# Patient Record
Sex: FEMALE | Race: OTHER | Employment: UNEMPLOYED | URBAN - METROPOLITAN AREA
[De-identification: names, ages, dates, MRNs, and addresses within clinical notes are randomized per-mention and may not be internally consistent; named-entity substitution may affect disease eponyms.]

---

## 2018-10-31 ENCOUNTER — OFFICE VISIT (OUTPATIENT)
Dept: INTERNAL MEDICINE CLINIC | Age: 33
End: 2018-10-31

## 2018-10-31 VITALS
OXYGEN SATURATION: 97 % | SYSTOLIC BLOOD PRESSURE: 107 MMHG | TEMPERATURE: 97.9 F | RESPIRATION RATE: 16 BRPM | HEART RATE: 65 BPM | BODY MASS INDEX: 33.02 KG/M2 | HEIGHT: 67 IN | WEIGHT: 210.4 LBS | DIASTOLIC BLOOD PRESSURE: 76 MMHG

## 2018-10-31 DIAGNOSIS — K21.9 GASTROESOPHAGEAL REFLUX DISEASE, ESOPHAGITIS PRESENCE NOT SPECIFIED: ICD-10-CM

## 2018-10-31 DIAGNOSIS — K80.20 CALCULUS OF GALLBLADDER WITHOUT CHOLECYSTITIS WITHOUT OBSTRUCTION: ICD-10-CM

## 2018-10-31 DIAGNOSIS — R10.11 RIGHT UPPER QUADRANT PAIN: Primary | ICD-10-CM

## 2018-10-31 DIAGNOSIS — Z23 ENCOUNTER FOR IMMUNIZATION: ICD-10-CM

## 2018-10-31 RX ORDER — NORGESTIMATE AND ETHINYL ESTRADIOL 0.25-0.035
1 KIT ORAL DAILY
COMMUNITY
End: 2019-11-12 | Stop reason: ALTCHOICE

## 2018-10-31 NOTE — PROGRESS NOTES
Chief Complaint Patient presents with  Abdominal Pain Glenn Cortes is an 29 y.o. female who presents for evaluation of abdominal pain. The pain is described as burning and stabbing, and is 8/10 in intensity. Pain is located in the RUQ without radiation. Onset was 4 months ago. Symptoms have been unchanged since. Aggravating factors: eating. Alleviating factors: none. Associated symptoms: none. The patient denies anorexia, arthralgias, chills, constipation, dysuria, flatus, hematochezia, melena, myalgias, nausea, sweats and vomiting. Reviewed and agree with Nurse Note and duplicated in this note. Reviewed PmHx, RxHx, FmHx, SocHx, AllgHx and updated and dated in the chart. History reviewed. No pertinent family history. Past Medical History:  
Diagnosis Date  Gallstones Social History Socioeconomic History  Marital status:  Spouse name: Not on file  Number of children: Not on file  Years of education: Not on file  Highest education level: Not on file Social Needs  Financial resource strain: Not on file  Food insecurity - worry: Not on file  Food insecurity - inability: Not on file  Transportation needs - medical: Not on file  Transportation needs - non-medical: Not on file Occupational History  Not on file Tobacco Use  Smoking status: Never Smoker  Smokeless tobacco: Never Used Substance and Sexual Activity  Alcohol use: No  
  Frequency: Never  Drug use: No  
 Sexual activity: Yes Other Topics Concern  Not on file Social History Narrative  Not on file Review of Systems - negative except as listed above Objective:  
 
Vitals:  
 10/31/18 1045 BP: 107/76 Pulse: 65 Resp: 16 Temp: 97.9 °F (36.6 °C) TempSrc: Oral  
SpO2: 97% Weight: 210 lb 6.4 oz (95.4 kg) Height: 5' 7\" (1.702 m) Physical Examination: General appearance - alert, well appearing, and in no distress Eyes - pupils equal and reactive, extraocular eye movements intact Ears - bilateral TM's and external ear canals normal 
Nose - normal and patent, no erythema, discharge or polyps Mouth - mucous membranes moist, pharynx normal without lesions Neck - supple, no significant adenopathy Chest - clear to auscultation, no wheezes, rales or rhonchi, symmetric air entry Heart - normal rate, regular rhythm, normal S1, S2, no murmurs, rubs, clicks or gallops Abdomen - soft, nontender, nondistended, no masses or organomegaly Musculoskeletal - no joint tenderness, deformity or swelling Extremities - peripheral pulses normal, no pedal edema, no clubbing or cyanosis Assessment/ Plan:  
Diagnoses and all orders for this visit: 1. Right upper quadrant pain -     H PYLORI AB, IGG, QT 
-     METABOLIC PANEL, COMPREHENSIVE 
-     REFERRAL TO GASTROENTEROLOGY 2. Encounter for immunization 
-     INFLUENZA VIRUS VAC QUAD,SPLIT,PRESV FREE SYRINGE IM 
-     MS IMMUNIZ ADMIN,1 SINGLE/COMB VAC/TOXOID 3. Gastroesophageal reflux disease, esophagitis presence not specified 4. Calculus of gallbladder without cholecystitis without obstruction No pain currently, will obtain GI referral 
Follow-up Disposition: 
Return if symptoms worsen or fail to improve. Discussed the patient's I have reviewed/discussed the above normal BMI with the patient. I have recommended the following interventions: dietary management education, guidance, and counseling . I have discussed the diagnosis with the patient and the intended plan as seen in the above orders. The patient has received an after-visit summary and questions were answered concerning future plans. Medication Side Effects and Warnings were discussed with patient, Patient Labs were reviewed and or requested, and 
Patient Past Records were reviewed and or requested  yes Pt agrees to call or return to clinic and/or go to closest ER with any worsening of symptoms. This may include, but not limited to increased fever (>100.4) with NSAIDS or Tylenol, increased edema, confusion, rash, worsening of presenting symptoms.

## 2018-11-01 LAB
ALBUMIN SERPL-MCNC: 4.4 G/DL (ref 3.5–5.5)
ALBUMIN/GLOB SERPL: 1.5 {RATIO} (ref 1.2–2.2)
ALP SERPL-CCNC: 78 IU/L (ref 39–117)
ALT SERPL-CCNC: 37 IU/L (ref 0–32)
AST SERPL-CCNC: 19 IU/L (ref 0–40)
BILIRUB SERPL-MCNC: 0.3 MG/DL (ref 0–1.2)
BUN SERPL-MCNC: 12 MG/DL (ref 6–20)
BUN/CREAT SERPL: 17 (ref 9–23)
CALCIUM SERPL-MCNC: 9.8 MG/DL (ref 8.7–10.2)
CHLORIDE SERPL-SCNC: 104 MMOL/L (ref 96–106)
CO2 SERPL-SCNC: 25 MMOL/L (ref 20–29)
CREAT SERPL-MCNC: 0.72 MG/DL (ref 0.57–1)
GLOBULIN SER CALC-MCNC: 3 G/DL (ref 1.5–4.5)
GLUCOSE SERPL-MCNC: 91 MG/DL (ref 65–99)
H PYLORI IGG SER IA-ACNC: 8.6 INDEX VALUE (ref 0–0.79)
POTASSIUM SERPL-SCNC: 3.9 MMOL/L (ref 3.5–5.2)
PROT SERPL-MCNC: 7.4 G/DL (ref 6–8.5)
SODIUM SERPL-SCNC: 144 MMOL/L (ref 134–144)

## 2018-11-01 RX ORDER — LANSOPRAZOLE, AMOXICILLIN, CLARITHROMYCIN 30-500-500
1 KIT ORAL DAILY
Qty: 28 TAB | Refills: 0 | Status: SHIPPED | OUTPATIENT
Start: 2018-11-01 | End: 2018-11-05 | Stop reason: CLARIF

## 2018-11-01 RX ORDER — LANSOPRAZOLE, AMOXICILLIN, CLARITHROMYCIN 30-500-500
1 KIT ORAL DAILY
Qty: 28 TAB | Refills: 0 | Status: SHIPPED | OUTPATIENT
Start: 2018-11-01 | End: 2018-11-01 | Stop reason: SDUPTHER

## 2018-11-02 NOTE — TELEPHONE ENCOUNTER
Medication that was sent needs a PA for her stomach. Please call pts .  is notified that doctor and nurse has left for today.  Advised to take her to ER if she is in a lot of pain

## 2018-11-05 RX ORDER — AMOXICILLIN 500 MG/1
1000 CAPSULE ORAL 2 TIMES DAILY
Qty: 56 CAP | Refills: 0 | Status: SHIPPED | OUTPATIENT
Start: 2018-11-05 | End: 2018-11-19

## 2018-11-05 RX ORDER — CLARITHROMYCIN 500 MG/1
500 TABLET, FILM COATED ORAL 2 TIMES DAILY
Qty: 28 TAB | Refills: 0 | Status: SHIPPED | OUTPATIENT
Start: 2018-11-05 | End: 2018-11-19

## 2018-11-05 RX ORDER — LANSOPRAZOLE 30 MG/1
30 CAPSULE, DELAYED RELEASE ORAL
Qty: 14 CAP | Refills: 0 | Status: SHIPPED | OUTPATIENT
Start: 2018-11-05 | End: 2018-11-19

## 2018-11-08 ENCOUNTER — TELEPHONE (OUTPATIENT)
Dept: INTERNAL MEDICINE CLINIC | Age: 33
End: 2018-11-08

## 2018-11-08 NOTE — TELEPHONE ENCOUNTER
I called patient and relayed information from Dr. La Stephens. Patient understood and will call with any questions.

## 2018-11-08 NOTE — TELEPHONE ENCOUNTER
----- Message from Silvestre Thorpe MD sent at 11/8/2018  8:14 AM EST -----  Regarding: RE: Dr. Caty Andrade  While these medications are safe for breast-feeding they can be excreted in small amounts in breastmilk. If she would like to hold off on taking the medications until after finished breast-feeding I think this is a fair option. RB  ----- Message -----  From: Jairo Lopez  Sent: 11/7/2018   4:05 PM  To: Silvestre Thorpe MD  Subject: FW: Dr. Caty Andrade                           Is it okay to take all 3 meds while breast feeding?    ----- Message -----  From: Dallas Armas  Sent: 11/7/2018   3:38 PM  To: Javi Encinas  Subject: FW: Dr. Caty Andrade                               ----- Message -----  From: Matthew Reina  Sent: 11/7/2018   3:29 PM  To: [facility] Front Office  Subject: Dr. Diya Sauceda () is requesting a call back to verify that an antibiotic is ok for his wife to take. He does not know the name of the Rx. Best contact [phone].

## 2019-03-26 ENCOUNTER — OFFICE VISIT (OUTPATIENT)
Dept: INTERNAL MEDICINE CLINIC | Age: 34
End: 2019-03-26

## 2019-03-26 VITALS
WEIGHT: 218 LBS | RESPIRATION RATE: 16 BRPM | DIASTOLIC BLOOD PRESSURE: 75 MMHG | OXYGEN SATURATION: 99 % | TEMPERATURE: 98 F | HEART RATE: 68 BPM | HEIGHT: 67 IN | SYSTOLIC BLOOD PRESSURE: 134 MMHG | BODY MASS INDEX: 34.21 KG/M2

## 2019-03-26 DIAGNOSIS — M54.31 RIGHT SIDED SCIATICA: Primary | ICD-10-CM

## 2019-03-26 NOTE — PROGRESS NOTES
Chief Complaint Patient presents with  Abdominal Pain 42-year-old female patient with a 8month-old baby complains of right-sided low back pain that radiates down to her knee and then to her foot. Patient describes his pain is sharp and can at times be 8 out of 10 in nature. Patient states that she is breast-feeding and has not had a period in 10 months where she is breast-feeding. Denies any trauma or similar symptoms in the past. 
Of note she is recently had a gallbladder surgery which she still has some residual pain in her abdomen rated at a 1 out of 10. She has a follow-up with GI Reviewed and agree with Nurse Note and duplicated in this note. Reviewed PmHx, RxHx, FmHx, SocHx, AllgHx and updated and dated in the chart. History reviewed. No pertinent family history. Past Medical History:  
Diagnosis Date  Gallstones Social History Socioeconomic History  Marital status:  Spouse name: Not on file  Number of children: Not on file  Years of education: Not on file  Highest education level: Not on file Tobacco Use  Smoking status: Never Smoker  Smokeless tobacco: Never Used Substance and Sexual Activity  Alcohol use: No  
  Frequency: Never  Drug use: No  
 Sexual activity: Yes Review of Systems - negative except as listed above Objective:  
 
Vitals:  
 03/26/19 1511 BP: 134/75 Pulse: 68 Resp: 16 Temp: 98 °F (36.7 °C) TempSrc: Oral  
SpO2: 99% Weight: 218 lb (98.9 kg) Height: 5' 7\" (1.702 m) Physical Examination: General appearance - alert, well appearing, and in no distress Chest - clear to auscultation, no wheezes, rales or rhonchi, symmetric air entry Heart - normal rate, regular rhythm, normal S1, S2, no murmurs, rubs, clicks or gallops Abdomen - soft, nontender, nondistended, no masses or organomegaly Back exam - full range of motion, no tenderness, palpable spasm or pain on motion Neurological - alert, oriented, normal speech, no focal findings or movement disorder noted Musculoskeletal - no joint tenderness, deformity or swelling Extremities - peripheral pulses normal, no pedal edema, no clubbing or cyanosis Skin - normal coloration and turgor, no rashes, no suspicious skin lesions noted Assessment/ Plan:  
Diagnoses and all orders for this visit: 1. Right sided sciatica -     XR SPINE LUMB 2 OR 3 V; Future 
-     REFERRAL TO PHYSICAL THERAPY I have reviewed/discussed the above normal BMI with the patient. I have recommended the following interventions: dietary management education, guidance, and counseling . I have discussed the diagnosis with the patient and the intended plan as seen in the above orders. The patient has received an after-visit summary and questions were answered concerning future plans. Medication Side Effects and Warnings were discussed with patient, Patient Labs were reviewed and or requested, and 
Patient Past Records were reviewed and or requested  yes Pt agrees to call or return to clinic and/or go to closest ER with any worsening of symptoms. This may include, but not limited to increased fever (>100.4) with NSAIDS or Tylenol, increased edema, confusion, rash, worsening of presenting symptoms.

## 2019-04-16 ENCOUNTER — HOSPITAL ENCOUNTER (OUTPATIENT)
Dept: PHYSICAL THERAPY | Age: 34
End: 2019-04-16
Payer: MEDICAID

## 2019-04-16 ENCOUNTER — HOSPITAL ENCOUNTER (OUTPATIENT)
Dept: PHYSICAL THERAPY | Age: 34
Discharge: HOME | End: 2019-04-16
Payer: MEDICAID

## 2019-04-16 PROCEDURE — 97161 PT EVAL LOW COMPLEX 20 MIN: CPT

## 2019-04-16 PROCEDURE — 97110 THERAPEUTIC EXERCISES: CPT

## 2019-04-16 NOTE — PROGRESS NOTES
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 SUBJECTIVE  Pain Level (0-10 scale): 2/10  Any medication changes, allergies to medications, adverse drug reactions, diagnosis change, or new procedure performed?: [] No    [x] Yes (see summary sheet for update)  Subjective:    Pt was referred to skilled PT for right-sided LBP with sciatica. Today, Pt reports primary complaints of intermittent right-sided low back pain with posterior right leg symptoms (numbness) to right foot. Pt notes mild increased weakness in RLE. Mechanism of Injury: Symptoms began 3/3/19, Pt lifted a heavy suitcase and felt sharp pain in low back, and then started to develop symptoms in RLE. Aggravating factors include prolonged standing (30 minutes), prolonged sitting (45 minutes), bending forward, picking up objects from floor. Relieving factors include sitting, lying down. Patient reports functional limitations with: cooking     Previous Treatment/Compliance: None   Work Hx: Not working    Living Situation: Pt lives with  in apartment  PMHx/Surgical Hx:  ();  \"Of note she recently had a gallbladder surgery which she still has some residual pain in her abdomen rated at a 1 out of 10.\" (per MD)     Pt Goals: Feel better and less pain     OBJECTIVE    Posture:  Mild slumped  Other Observations:  Pt speaks Bengali only; used blue phone for communication;  was upstairs for appointment and speaks moderately fluent English  Gait and Functional Mobility:  Antalgic  Palpation: Significant TTP L3 and L4 spinous processes; mild increased turgor/tenderness lumbar paraspinals bilaterally R>L; TTP R knee lateral joint line        Lumbar AROM:          R  L    Flexion    100% increased symptoms in RLE       Extension   50% p! In low back to RLE      Side Bending   100% mild R low back  100% stretch R    Rotation   100% p!  100% p! LOWER QUARTER   MUSCLE STRENGTH  KEY       R  L  0 - No Contraction  L1, L2 Psoas  4-  4  1 - Trace   L3 Quads  5  5  2 - Poor   L4 Tib Ant  5  5  3 - Fair    L5 EHL  5  5  4 - Good   S1 Peroneals  5  5  5 - Normal   S2 Hams  4 p! Low back 5    Flexibility: Decreased flexibility of bilateral hip flexors and right hamstring       MMT:               HIP Ext: 4- bilaterally              HIP Abd: R: 3; L: 3+  Neurological: Reflexes / Sensations: Not assessed today  Special Tests:    FABERS: (+)     FADDIR: (-)   Forward Bend: (+)    Slump: (+) R with knee extension and DF (LBP)   H.S. SLR: (+) 70 R; 90 L   Piriformis Ext: (-)    10 min Therapeutic Exercise:  [] See flow sheet :   Rationale: increase ROM and increase strength to improve the patients ability to perform functional activities with decreased pain or discomfort. With   [x] TE   [] TA   [] neuro   [] other: Patient Education: [x] Review HEP    [] Progressed/Changed HEP based on:   [] positioning   [] body mechanics   [] transfers   [] heat/ice application    [x] other: Educated Pt regarding impairments, role of PT, and POC.        Other Objective/Functional Measures:   FOTO Score: 94/100    Pain Level (0-10 scale) post treatment: 2/10    ASSESSMENT/Changes in Function:     [x]  See Plan of [address] 4/16/2019

## 2019-04-16 NOTE — PROGRESS NOTES
[facility] [place] [facility]  [address], [facility] [address]   Phone: [phone]  Fax: [phone]    Plan of Care/Statement of Necessity for Physical Therapy Services  2-15    Patient name: Dana Benito  : 1985  Provider#: [id number]  Referral source: Carmela Cheung MD      Medical/Treatment Diagnosis: No admission diagnoses are documented for this encounter. Prior Hospitalization: see medical history     Comorbidities: Back pain, BMI>30, Prior surgery  Prior Level of Function: Patient completed 20 minutes of exercise seldom or never. Medications: Verified on Patient Summary List    Start of Care: 19      Onset Date: 3/3/19       The Plan of Care and following information is based on the information from the initial evaluation. Assessment/ key information: Pt is a pleasant 34year old female who was referred to skilled PT for right-sided LBP with sciatica. Pt reports symptoms began in the beginning of March when bending over to lift up a heavy suitcase. Based on examination, patient presents with symptoms consistent with lumbar derangement and right-sided radiculopathy. Pt exhibits most tenderness over L3-4 spinous processes, decreased lumbar AROM, significant hip and core weakness, and impaired gait mechanics.     Evaluation Complexity History MEDIUM  Complexity : 1-2 comorbidities / personal factors will impact the outcome/ POC ; Examination MEDIUM Complexity : 3 Standardized tests and measures addressing body structure, function, activity limitation and / or participation in recreation  ;Presentation LOW Complexity : Stable, uncomplicated  ;Clinical Decision Making LOW Complexity : FOTO score of   Overall Complexity Rating: LOW     Problem List: pain affecting function, decrease ROM, decrease strength, decrease ADL/ functional abilitiies, decrease activity tolerance and decrease flexibility/ joint mobility   Treatment Plan may include any combination of the following: Therapeutic exercise, Therapeutic activities, Neuromuscular re-education, Physical agent/modality, Manual therapy and Patient education  Patient / Family readiness to learn indicated by: asking questions, trying to perform skills and interest  Persons(s) to be included in education: patient (P)  Barriers to Learning/Limitations: None  Patient Goal (s): Feel better and less pain.   Patient Self Reported Health Status: good  Rehabilitation Potential: good    Short and Long Term Goals: To be accomplished in 12 treatments:   1. Pt will be independent and compliant with HEP. 2. Pt will improve FOTO score by the MCID demonstrating improved overall function with decreased pain or discomfort. 3. Pt will be able to stand >/= 45 minutes without complaints of increased low back or right leg pain in order to return to cooking with decreased discomfort/restriction. 4. Pt will be able to sit for >/= 60 minutes without complaints of increased low back or right leg pain. 5. Pt will be able to bend over to pick objects up off the floor without increased low back or right leg pain. Frequency / Duration: Patient to be seen 1-2 times per week for 12 treatments. Patient/ Caregiver education and instruction: self care, activity modification and exercises    [x]  Plan of care has been reviewed with NAOMIE Gordon 4/16/2019     ________________________________________________________________________    I certify that the above Therapy Services are being furnished while the patient is under my care. I agree with the treatment plan and certify that this therapy is necessary.     [de-identified] Signature:____________________  Date:____________Time: _________

## 2019-04-23 ENCOUNTER — APPOINTMENT (OUTPATIENT)
Dept: PHYSICAL THERAPY | Age: 34
End: 2019-04-23
Payer: MEDICAID

## 2019-04-24 ENCOUNTER — APPOINTMENT (OUTPATIENT)
Dept: PHYSICAL THERAPY | Age: 34
End: 2019-04-24
Payer: MEDICAID

## 2019-04-30 ENCOUNTER — HOSPITAL ENCOUNTER (OUTPATIENT)
Dept: PHYSICAL THERAPY | Age: 34
Discharge: HOME | End: 2019-04-30
Payer: MEDICAID

## 2019-04-30 PROCEDURE — 97110 THERAPEUTIC EXERCISES: CPT

## 2019-04-30 NOTE — PROGRESS NOTES
PT DAILY TREATMENT NOTE - [facility] 2-15    Patient Name: Joaquina Wilson  Date:2019  : 1985  [x]  Patient  Verified  Payor: BLUE CROSS MEDICAID / Plan: [facility] [email or web address] HE[employer]KEEPERS PLUS / Product Type: Managed Care Medicaid /    In time:4:30 pm  Out time:5:14 pm  Total Treatment Time (min): 44 minutes  Total Timed Codes (min): 44 minutes  1:1 Treatment Time ( only): 39 minutes  Visit #:  2    Treatment Area: Low back pain [M54.5]  Right leg pain [M79.604]    SUBJECTIVE  Pain Level (0-10 scale): 1/10  Any medication changes, allergies to medications, adverse drug reactions, diagnosis change, or new procedure performed?: [x] No    [] Yes (see summary sheet for update)  Subjective functional status/changes:   [] No changes reported  Pt reports (via translation of her ) she feels better, and is able to lean forward with less pain. She is no longer experiencing symptoms into her right leg and able to stand washing dishes for longer with only mild discomfort in her low back. She has been diligent with her exercises. OBJECTIVE    44 min Therapeutic Exercise:  [] See flow sheet :   Rationale: increase ROM and increase strength to improve the patients ability to perform functional activities with decreased pain. With   [x] TE   [] TA   [] neuro   [] other: Patient Education: [x] Review HEP    [] Progressed/Changed HEP based on:   [] positioning   [] body mechanics   [] transfers   [] heat/ice application    [] other:      Other Objective/Functional Measures: --     Pain Level (0-10 scale) post treatment: 0/10    ASSESSMENT/Changes in Function:   Introduced clam shells for increased hip strength with verbal and tactile cues to maintain anterior rotation of hips. Also incorporated core stabilization with standing core press; provided cues for proper technique and posture to engage core properly.  Pt noted knee pain initially with total gym which was relieved with cues to decrease knee flexion, push down through her heels, and band resistance around knees. Pt responding well to exercise, continue progressing as tolerated. Patient will continue to benefit from skilled PT services to modify and progress therapeutic interventions, address functional mobility deficits, address ROM deficits, address strength deficits, analyze and address soft tissue restrictions, analyze and cue movement patterns, analyze and modify body mechanics/ergonomics and assess and modify postural abnormalities to attain remaining goals. [x]  See Plan of Care  []  See progress note/recertification  []  See Discharge Summary         Progress towards goals / Updated goals:  Short and Long Term Goals: To be accomplished in 12 treatments:               1. Pt will be independent and compliant with HEP. 2. Pt will improve FOTO score by the MCID demonstrating improved overall function with decreased pain or discomfort. 3. Pt will be able to stand >/= 45 minutes without complaints of increased low back or right leg pain in order to return to cooking with decreased discomfort/restriction. 4. Pt will be able to sit for >/= 60 minutes without complaints of increased low back or right leg pain. 5. Pt will be able to bend over to pick objects up off the floor without increased low back or right leg pain.     PLAN  [x]  Upgrade activities as tolerated     [x]  Continue plan of care  []  Update interventions per flow sheet       []  Discharge due to:_  []  Other:_      Félix Cortez 4/30/2019

## 2019-05-07 ENCOUNTER — HOSPITAL ENCOUNTER (OUTPATIENT)
Dept: PHYSICAL THERAPY | Age: 34
End: 2019-05-07
Payer: MEDICAID

## 2019-05-14 ENCOUNTER — OFFICE VISIT (OUTPATIENT)
Dept: INTERNAL MEDICINE CLINIC | Age: 34
End: 2019-05-14

## 2019-05-14 ENCOUNTER — HOSPITAL ENCOUNTER (OUTPATIENT)
Dept: PHYSICAL THERAPY | Age: 34
Discharge: HOME | End: 2019-05-14
Payer: MEDICAID

## 2019-05-14 VITALS
OXYGEN SATURATION: 97 % | DIASTOLIC BLOOD PRESSURE: 79 MMHG | SYSTOLIC BLOOD PRESSURE: 119 MMHG | WEIGHT: 214.7 LBS | BODY MASS INDEX: 33.63 KG/M2 | TEMPERATURE: 98.2 F | HEART RATE: 70 BPM

## 2019-05-14 DIAGNOSIS — M94.0 COSTOCHONDRITIS: ICD-10-CM

## 2019-05-14 DIAGNOSIS — R10.11 RIGHT UPPER QUADRANT PAIN: Primary | ICD-10-CM

## 2019-05-14 PROCEDURE — 97110 THERAPEUTIC EXERCISES: CPT

## 2019-05-14 RX ORDER — ACETAMINOPHEN AND CODEINE PHOSPHATE 120; 12 MG/5ML; MG/5ML
SOLUTION ORAL
Refills: 3 | COMMUNITY
Start: 2019-05-04 | End: 2019-05-14 | Stop reason: SDUPTHER

## 2019-05-14 RX ORDER — ACETAMINOPHEN AND CODEINE PHOSPHATE 120; 12 MG/5ML; MG/5ML
SOLUTION ORAL
Qty: 1 DOSE PACK | Refills: 3 | Status: SHIPPED | OUTPATIENT
Start: 2019-05-14 | End: 2019-09-15 | Stop reason: SDUPTHER

## 2019-05-14 NOTE — PROGRESS NOTES
Chief Complaint   Patient presents with    Abdominal Pain      Susie Miller is an [address] [place] y.o. female who presents for evaluation of abdominal pain. The pain is described as burning and sharp, and is 0/10 in intensity. Pain is located in the RUQ without radiation. Onset was 6 months ago. Symptoms have been unchanged since. Aggravating factors: movement, activity and pressure. Alleviating factors: none. Associated symptoms: patient has surgery. The patient denies diarrhea, nausea, urinary frequency, urinary urgency and vomiting. Reviewed and agree with Nurse Note and duplicated in this note. Reviewed PmHx, RxHx, FmHx, SocHx, AllgHx and updated and dated in the chart. No family history on file.     Past Medical History:   Diagnosis Date    Gallstones       Social History     Socioeconomic History    Marital status:      Spouse name: Not on file    Number of children: Not on file    Years of education: Not on file    Highest education level: Not on file   Tobacco Use    Smoking status: Never Smoker    Smokeless tobacco: Never Used   Substance and Sexual Activity    Alcohol use: No     Frequency: Never    Drug use: No    Sexual activity: Yes        Review of Systems - negative except as listed above      Objective:     Vitals:    05/14/19 1514   BP: 119/79   Pulse: 70   Temp: 98.2 °F (36.8 °C)   SpO2: 97%   Weight: 214 lb 11.2 oz (97.4 kg)       Physical Examination: General appearance - alert, well appearing, and in no distress  Eyes - pupils equal and reactive, extraocular eye movements intact  Ears - bilateral TM's and external ear canals normal  Nose - normal and patent, no erythema, discharge or polyps  Mouth - mucous membranes moist, pharynx normal without lesions  Neck - supple, no significant adenopathy  Chest - clear to auscultation, no wheezes, rales or rhonchi, symmetric air entry  Heart - normal rate, regular rhythm, normal S1, S2, no murmurs, rubs, clicks or gallops  Abdomen - soft, nontender, nondistended, no masses or organomegaly  Musculoskeletal - no joint tenderness, deformity or swelling  Extremities - peripheral pulses normal, no pedal edema, no clubbing or cyanosis    Assessment/ Plan:   Diagnoses and all orders for this visit:    1. Right upper quadrant pain  -     REFERRAL TO GASTROENTEROLOGY    2. Costochondritis    Other orders  -     norethindrone (MICRONOR) 0.35 mg tab; TAKE 1 TABLET BY MOUTH EVERY DAY       Tinea ibuprofen 800 mg 3 times a day  Return to clinic if no resolution of symptoms        I have discussed the diagnosis with the patient and the intended plan as seen in the above orders. The patient has received an after-visit summary and questions were answered concerning future plans. Medication Side Effects and Warnings were discussed with patient,  Patient Labs were reviewed and or requested, and  Patient Past Records were reviewed and or requested  yes         Pt agrees to call or return to clinic and/or go to closest ER with any worsening of symptoms. This may include, but not limited to increased fever (>100.4) with NSAIDS or Tylenol, increased edema, confusion, rash, worsening of presenting symptoms.

## 2019-05-14 NOTE — PATIENT INSTRUCTIONS

## 2019-05-14 NOTE — ANCILLARY DISCHARGE INSTRUCTIONS
[facility]  [address], [address] [place] [address]  Phone: [phone]  Fax: [phone]    Medicaid Discharge Summary  2-15    Patient name: Stefan Barragan  : 1985  Provider#: [id number]  Referral source: Brii Taylor MD      Medical/Treatment Diagnosis: Low back pain [M54.5]  Right leg pain [M79.604]     Prior Hospitalization: see medical history     Comorbidities: Back pain, BMI>30, Prior surgery  Prior Level of Function: Patient completed 20 minutes of exercise seldom or never. Medications: Verified on Patient Summary List     Start of Care: 19                                                            Onset Date: 3/3/19          Visits from Start of Care: 3     Missed Visits: 1  Reporting Period : 19 to 19      ASSESSMENT/SUMMARY OF CARE:  Ms. Kranthi Mosqueda has been seen for 3 skilled physical therapy visits secondary to low back pain and right-sided lumbar radiculopathy. Pt progressed very well with her therapy program and is no longer experiencing low back pain or radiating symptoms in her right leg. She was unable to stand for longer than 45 minutes upon evaluation, but is now standing >90 minutes without complaints of low back pain. She is also able to carry objects and pick objects up off the floor without increased pain. Pt has been provided a comprehensive HEP to maintain benefit independently. Thank you for this referral!    Short and Long Term Goals:  To be accomplished in 12 treatments:               1. Pt will be independent and compliant with HEP. - MET               2. Pt will improve FOTO score by the MCID demonstrating improved overall function with decreased pain or discomfort. - MET               3. Pt will be able to stand >/= 45 minutes without complaints of increased low back or right leg pain in order to return to cooking with decreased discomfort/restriction. - MET               4. Pt will be able to sit for >/= 60 minutes without complaints of increased low back or right leg pain. - MET               5. Pt will be able to bend over to pick objects up off the floor without increased low back or right leg pain. - MET     RECOMMENDATIONS:  [x]Discontinue therapy: [x]Patient has reached or is progressing toward set goals      []Patient is non-compliant or has abdicated      []Due to lack of appreciable progress towards set goals    Volodymyr Restrepo 5/14/2019     ______________________________________________________________________    NOTE TO PHYSICIAN:  Please complete the following and fax to: [facility]: Fax: [phone] . Rach Raza Retain this original for your records. If you are unable to process this request in 24 hours, please contact our office.      [de-identified] Signature:____________________  Date:____________Time:_________

## 2019-05-14 NOTE — PROGRESS NOTES
PT DAILY TREATMENT NOTE - [facility] 2-15    Patient Name: Aline Rasheed  Date:2019  : 1985  [x]  Patient  Verified  Payor: [employer] MEDICA[employer] / Plan: [employer] / Product Type: Managed Care Medicaid /    In time: 3:55 pm  Out time: 4:50 pm  Total Treatment Time (min): 55 minutes  Total Timed Codes (min): 55 minutes  1:1 Treatment Time ( only): 55 minutes  Visit #:  3    Treatment Area: Low back pain [M54.5]  Right leg pain [M79.604]    SUBJECTIVE  Pain Level (0-10 scale): 0/10  Any medication changes, allergies to medications, adverse drug reactions, diagnosis change, or new procedure performed?: [x] No    [] Yes (see summary sheet for update)  Subjective functional status/changes:   [] No changes reported  Pt reports (via translation of her ) that her back has been doing well and has not been having any pain. She is experiencing some right-sided chest pain and was diagnosed with costochondritis. She stopped doing KRISH due to irritation in chest, but all other exercises feel good. OBJECTIVE    55 min Therapeutic Exercise:  [] See flow sheet :   Rationale: increase ROM and increase strength to improve the patients ability to perform functional activities with decreased pain. With   [x] TE   [] TA   [] neuro   [] other: Patient Education: [x] Review HEP    [] Progressed/Changed HEP based on:   [] positioning   [] body mechanics   [] transfers   [] heat/ice application    [] other:      Other Objective/Functional Measures:   0-100: 80% improved: Now she can carry and stand for long periods of time without discomfort (90+ minutes) (could not take a shower)  FOTO Score: 94/100    Pain Level (0-10 scale) post treatment: 0/10    ASSESSMENT/Changes in Function:     []  See Plan of Care  []  See progress note/recertification  [x]  See Discharge Summary         Progress towards goals / Updated goals:  Short and Long Term Goals:  To be accomplished in 12 treatments: 1. Pt will be independent and compliant with HEP. - MET               2. Pt will improve FOTO score by the MCID demonstrating improved overall function with decreased pain or discomfort. - MET                3. Pt will be able to stand >/= 45 minutes without complaints of increased low back or right leg pain in order to return to cooking with decreased discomfort/restriction. - MET               4. Pt will be able to sit for >/= 60 minutes without complaints of increased low back or right leg pain. - MET               5. Pt will be able to bend over to pick objects up off the floor without increased low back or right leg pain. - MET    PLAN  []  Upgrade activities as tolerated     []  Continue plan of care  []  Update interventions per flow sheet       [x]  Discharge due to: Pt reaching all short and long-term goals.    []  Other:_      London Greer 5/14/2019

## 2019-09-16 RX ORDER — ACETAMINOPHEN AND CODEINE PHOSPHATE 120; 12 MG/5ML; MG/5ML
SOLUTION ORAL
Qty: 28 TAB | Refills: 3 | Status: SHIPPED | OUTPATIENT
Start: 2019-09-16 | End: 2020-01-13

## 2019-11-12 ENCOUNTER — OFFICE VISIT (OUTPATIENT)
Dept: INTERNAL MEDICINE CLINIC | Age: 34
End: 2019-11-12

## 2019-11-12 VITALS
DIASTOLIC BLOOD PRESSURE: 63 MMHG | OXYGEN SATURATION: 99 % | WEIGHT: 226.4 LBS | SYSTOLIC BLOOD PRESSURE: 99 MMHG | TEMPERATURE: 97.8 F | BODY MASS INDEX: 35.53 KG/M2 | RESPIRATION RATE: 16 BRPM | HEIGHT: 67 IN | HEART RATE: 75 BPM

## 2019-11-12 DIAGNOSIS — R07.89 COSTOCHONDRAL PAIN: Primary | ICD-10-CM

## 2019-11-12 DIAGNOSIS — R10.11 RIGHT UPPER QUADRANT PAIN: ICD-10-CM

## 2019-11-12 DIAGNOSIS — E66.01 SEVERE OBESITY (HCC): ICD-10-CM

## 2019-11-12 NOTE — PROGRESS NOTES
No chief complaint on file. she is a 34y.o. year old female who presents for follow up of injury. Follow Up Pain Assessment Encounter      Onset of Symptoms: a couple months   ________________________________________________________________________  Description: Pain is now back and also around the rib cage    Pain Scale:(1-10): 5  Duration:  intermittent  Radiation: leg  What makes it better?: OTC meds and rest  What makes it worse?:exercise, sitting and walking  Medications tried: acetaminophen, ibuprofen  Modalities tried: PT         Reviewed and agree with Nurse Note and duplicated in this note. Reviewed PmHx, RxHx, FmHx, SocHx, AllgHx and updated and dated in the chart. No family history on file.     Past Medical History:   Diagnosis Date    Gallstones       Social History     Socioeconomic History    Marital status:      Spouse name: Not on file    Number of children: Not on file    Years of education: Not on file    Highest education level: Not on file   Tobacco Use    Smoking status: Never Smoker    Smokeless tobacco: Never Used   Substance and Sexual Activity    Alcohol use: No     Frequency: Never    Drug use: No    Sexual activity: Yes        Review of Systems - negative except as listed above      Objective:     Vitals:    11/12/19 1144   BP: 99/63   Pulse: 75   Resp: 16   Temp: 97.8 °F (36.6 °C)   TempSrc: Oral   SpO2: 99%   Weight: 226 lb 6.4 oz (102.7 kg)   Height: 5' 7\" (1.702 m)       Physical Examination: General appearance - alert, well appearing, and in no distress  Chest - clear to auscultation, no wheezes, rales or rhonchi, symmetric air entry  Heart - normal rate, regular rhythm, normal S1, S2, no murmurs, rubs, clicks or gallops  Abdomen - soft, nontender, nondistended, no masses or organomegaly  Back exam - full range of motion, no tenderness, palpable spasm or pain on motion  Neurological - alert, oriented, normal speech, no focal findings or movement disorder noted  Musculoskeletal -pain with palpation of right chest wall under breast line and axilla along rib cage  Extremities - peripheral pulses normal, no pedal edema, no clubbing or cyanosis  Skin - normal coloration and turgor, no rashes, no suspicious skin lesions noted    Assessment/ Plan:   Diagnoses and all orders for this visit:    1. Costochondral pain  -     XR CHEST PA LAT; Future    2. Severe obesity ([address]    3. Right upper quadrant pain  -     METABOLIC PANEL, COMPREHENSIVE           Pathophysiology, recovery and rehabilitation process discussed and questions answered   Counseling for 30 Minutes of the total visit duration   Pictures and figures used as necessary   Provided reassurance   Recommend activity modification   Recommend  lower impact activities-walking, Eliptical, Nordic Track, cycling or swimming   Follow up in 4 week(s)        I have discussed the diagnosis with the patient and the intended plan as seen in the above orders. The patient has received an after-visit summary and questions were answered concerning future plans. Medication Side Effects and Warnings were discussed with patient,  Patient Labs were reviewed and or requested, and  Patient Past Records were reviewed and or requested  yes     Pt agrees to call or return to clinic and/or go to closest ER with any worsening of symptoms. This may include, but not limited to increased fever (>100.4) with NSAIDS or Tylenol, increased edema, confusion, rash, worsening of presenting symptoms. 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 Most recent MRI with minimal residual tumor remaining. 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 History of Staph epidermidis CLABSI and ESBL+ E. coli which prompted removal of DL Mediport during previous admission. Patient now admitted for Single Cycle Consolidation with high dose chemotherapy and autologous peripheral blood stem cell rescue. Today is day -4 (7/13)    Sharron has continued to do well this admission. She will continue Etoposide and Thiotepa today (7/12-7/14). Due for Aloxi today for nausea. Patient with mild fever (38.1) overnight, defervesced without tylenol. Due to history of ecoli and staph epi bacteremia, patient was started on vancomycin and cefepime 7/13.

## 2019-11-13 DIAGNOSIS — R73.9 ELEVATED BLOOD SUGAR: Primary | ICD-10-CM

## 2019-11-13 LAB
ALBUMIN SERPL-MCNC: 4.3 G/DL (ref 3.5–5.5)
ALBUMIN/GLOB SERPL: 1.5 {RATIO} (ref 1.2–2.2)
ALP SERPL-CCNC: 72 IU/L (ref 39–117)
ALT SERPL-CCNC: 21 IU/L (ref 0–32)
AST SERPL-CCNC: 17 IU/L (ref 0–40)
BILIRUB SERPL-MCNC: 0.3 MG/DL (ref 0–1.2)
BUN SERPL-MCNC: 7 MG/DL (ref 6–20)
BUN/CREAT SERPL: 10 (ref 9–23)
CALCIUM SERPL-MCNC: 9.7 MG/DL (ref 8.7–10.2)
CHLORIDE SERPL-SCNC: 105 MMOL/L (ref 96–106)
CO2 SERPL-SCNC: 25 MMOL/L (ref 20–29)
CREAT SERPL-MCNC: 0.67 MG/DL (ref 0.57–1)
GLOBULIN SER CALC-MCNC: 2.8 G/DL (ref 1.5–4.5)
GLUCOSE SERPL-MCNC: 110 MG/DL (ref 65–99)
POTASSIUM SERPL-SCNC: 4.2 MMOL/L (ref 3.5–5.2)
PROT SERPL-MCNC: 7.1 G/DL (ref 6–8.5)
SODIUM SERPL-SCNC: 141 MMOL/L (ref 134–144)

## 2019-11-21 ENCOUNTER — CLINICAL SUPPORT (OUTPATIENT)
Dept: INTERNAL MEDICINE CLINIC | Age: 34
End: 2019-11-21

## 2019-11-21 DIAGNOSIS — R73.9 ELEVATED BLOOD SUGAR: Primary | ICD-10-CM

## 2019-11-22 LAB
EST. AVERAGE GLUCOSE BLD GHB EST-MCNC: 111 MG/DL
HBA1C MFR BLD: 5.5 % (ref 4.8–5.6)

## 2020-01-13 RX ORDER — ACETAMINOPHEN AND CODEINE PHOSPHATE 120; 12 MG/5ML; MG/5ML
SOLUTION ORAL
Qty: 28 TAB | Refills: 3 | Status: SHIPPED | OUTPATIENT
Start: 2020-01-13 | End: 2020-05-06 | Stop reason: SDUPTHER

## 2020-05-06 ENCOUNTER — VIRTUAL VISIT (OUTPATIENT)
Dept: INTERNAL MEDICINE CLINIC | Age: 35
End: 2020-05-06

## 2020-05-06 DIAGNOSIS — Z30.09 FAMILY PLANNING: ICD-10-CM

## 2020-05-06 DIAGNOSIS — K21.9 GASTROESOPHAGEAL REFLUX DISEASE, ESOPHAGITIS PRESENCE NOT SPECIFIED: Primary | ICD-10-CM

## 2020-05-06 RX ORDER — FAMOTIDINE 40 MG/1
40 TABLET, FILM COATED ORAL DAILY
Qty: 30 TAB | Refills: 0 | Status: SHIPPED | OUTPATIENT
Start: 2020-05-06 | End: 2021-03-24 | Stop reason: SDUPTHER

## 2020-05-06 RX ORDER — ACETAMINOPHEN AND CODEINE PHOSPHATE 120; 12 MG/5ML; MG/5ML
1 SOLUTION ORAL DAILY
Qty: 28 TAB | Refills: 3 | Status: SHIPPED | OUTPATIENT
Start: 2020-05-06 | End: 2020-08-31

## 2020-05-06 NOTE — PROGRESS NOTES
Srini Landrum is a 30 y.o. female who was seen by synchronous (real-time) audio-video technology on 5/6/2020. Consent: Srini Landrum, who was seen by synchronous (real-time) audio-video technology, and/or her healthcare decision maker, is aware that this patient-initiated, Telehealth encounter on 5/6/2020 is a billable service, with coverage as determined by her insurance carrier. She is aware that she may receive a bill and has provided verbal consent to proceed: Yes. Assessment & Plan:   Diagnoses and all orders for this visit:    1. Gastroesophageal reflux disease, esophagitis presence not specified    2. Family planning    Other orders  -     famotidine (PEPCID) 40 mg tablet; Take 1 Tab by mouth daily. -     norethindrone (MICRONOR) 0.35 mg tab; Take 1 Tab by mouth daily. Subjective:   Srini Landrum is a 30 y.o. female who was seen for OCP refill. Patient states that she is been taking this for many years, her last Pap smear was when she had her last child about 2 years ago. Patient states that she has not had an OB physician since giving birth. Patient also states she went to the ER for difficulty swallowing back in March. States that ER diagnosed her with GERD and gave her Zantac. Patient states that Zantac works follow-up but then she ran out of prescription and she would like to get a refill. Patient denies any chest pain, feels like there is something get stuck in her throat when she is swallowing. Denies any nausea vomiting constipation diarrhea    Prior to Admission medications    Medication Sig Start Date End Date Taking? Authorizing Provider   famotidine (PEPCID) 40 mg tablet Take 1 Tab by mouth daily.  5/6/20  Yes Syeda Hallman MD   norethindrone (MICRONOR) 0.35 mg tab TAKE 1 TABLET BY MOUTH EVERY DAY 1/13/20   Syeda Hallman MD     No Known Allergies    Current Outpatient Medications   Medication Sig Dispense Refill    famotidine (PEPCID) 40 mg tablet Take 1 Tab by mouth daily. 30 Tab 0    norethindrone (MICRONOR) 0.35 mg tab TAKE 1 TABLET BY MOUTH EVERY DAY 28 Tab 3     No Known Allergies  Past Medical History:   Diagnosis Date    Gallstones      Past Surgical History:   Procedure Laterality Date    HX GYN       No family history on file. Review of Systems   All other systems reviewed and are negative. Objective:   Vital Signs: (As obtained by patient/caregiver at home)  There were no vitals taken for this visit. [INSTRUCTIONS:  \"[x]\" Indicates a positive item  \"[]\" Indicates a negative item  -- DELETE ALL ITEMS NOT EXAMINED]    Constitutional: [x] Appears well-developed and well-nourished [x] No apparent distress      [] Abnormal -     Mental status: [x] Alert and awake  [x] Oriented to person/place/time [x] Able to follow commands    [] Abnormal -     Eyes:   EOM    [x]  Normal    [] Abnormal -   Sclera  [x]  Normal    [] Abnormal -          Discharge [x]  None visible   [] Abnormal -     HENT: [x] Normocephalic, atraumatic  [] Abnormal -   [x] Mouth/Throat: Mucous membranes are moist    External Ears [x] Normal  [] Abnormal -    Neck: [x] No visualized mass [] Abnormal -     Pulmonary/Chest: [x] Respiratory effort normal   [x] No visualized signs of difficulty breathing or respiratory distress        [] Abnormal -      Musculoskeletal:   [x] Normal gait with no signs of ataxia         [x] Normal range of motion of neck        [] Abnormal -     Neurological:        [x] No Facial Asymmetry (Cranial nerve 7 motor function) (limited exam due to video visit)          [x] No gaze palsy        [] Abnormal -          Skin:        [x] No significant exanthematous lesions or discoloration noted on facial skin         [] Abnormal -            Psychiatric:       [x] Normal Affect [] Abnormal -        [x] No Hallucinations    Other pertinent observable physical exam findings:-        We discussed the expected course, resolution and complications of the diagnosis(es) in detail. Medication risks, benefits, costs, interactions, and alternatives were discussed as indicated. I advised her to contact the office if her condition worsens, changes or fails to improve as anticipated. She expressed understanding with the diagnosis(es) and plan. Robby Mcdowell is a 30 y.o. female who was evaluated by a video visit encounter for concerns as above. Patient identification was verified prior to start of the visit. A caregiver was present when appropriate. Due to this being a TeleHealth encounter (During [facility] public health emergency), evaluation of the following organ systems was limited: Vitals/Constitutional/EENT/Resp/CV/GI//MS/Neuro/Skin/Heme-Lymph-Imm. Pursuant to the emergency declaration under the [facility], 1135 waiver authority and the [employer] and [employer] Act, this Virtual  Visit was conducted, with patient's (and/or legal guardian's) consent, to reduce the patient's risk of exposure to COVID-19 and provide necessary medical care. Services were provided through a video synchronous discussion virtually to substitute for in-person clinic visit. Patient and provider were located at their individual homes.       Donn Vences MD

## 2020-08-31 RX ORDER — ACETAMINOPHEN AND CODEINE PHOSPHATE 120; 12 MG/5ML; MG/5ML
SOLUTION ORAL
Qty: 28 TAB | Refills: 3 | Status: SHIPPED | OUTPATIENT
Start: 2020-08-31 | End: 2020-12-21

## 2020-11-30 ENCOUNTER — OFFICE VISIT (OUTPATIENT)
Dept: INTERNAL MEDICINE CLINIC | Age: 35
End: 2020-11-30
Payer: MEDICAID

## 2020-11-30 VITALS
HEART RATE: 74 BPM | OXYGEN SATURATION: 100 % | WEIGHT: 196.8 LBS | SYSTOLIC BLOOD PRESSURE: 119 MMHG | BODY MASS INDEX: 30.89 KG/M2 | HEIGHT: 67 IN | DIASTOLIC BLOOD PRESSURE: 72 MMHG

## 2020-11-30 DIAGNOSIS — J30.89 ENVIRONMENTAL AND SEASONAL ALLERGIES: Primary | ICD-10-CM

## 2020-11-30 PROCEDURE — 99214 OFFICE O/P EST MOD 30 MIN: CPT | Performed by: FAMILY MEDICINE

## 2020-11-30 RX ORDER — CETIRIZINE HCL 10 MG
10 TABLET ORAL DAILY
Qty: 30 TAB | Refills: 1 | Status: SHIPPED | OUTPATIENT
Start: 2020-11-30 | End: 2021-06-04 | Stop reason: ALTCHOICE

## 2020-11-30 RX ORDER — AZELASTINE HYDROCHLORIDE, FLUTICASONE PROPIONATE 137; 50 UG/1; UG/1
1 SPRAY, METERED NASAL DAILY
Qty: 23 G | Refills: 1 | Status: SHIPPED | OUTPATIENT
Start: 2020-11-30 | End: 2021-02-25

## 2020-11-30 RX ORDER — CETIRIZINE HCL 10 MG
10 TABLET ORAL DAILY
Qty: 30 TAB | Refills: 1 | Status: SHIPPED | OUTPATIENT
Start: 2020-11-30 | End: 2020-11-30 | Stop reason: SDUPTHER

## 2020-11-30 NOTE — PROGRESS NOTES
Chief Complaint   Patient presents with    Abdominal Pain     x 3/2020     she is a 30y.o. year old female who presents for follow up of injury. Follow Up Pain Assessment Encounter      Onset of Symptoms: 3/2020  ________________________________________________________________________  Description: Pain is now is unchanged. Pt was prescribed medication back in March 2020 that helped, then pain started coming back. Pain Scale:(1-10): 2  Duration:  intermittent  Radiation: none  What makes it better?: drinking something  What makes it worse?:certain smells and eating certain foods  Medications tried:  Modalities tried: n/a        Reviewed and agree with Nurse Note and duplicated in this note. Reviewed PmHx, RxHx, FmHx, SocHx, AllgHx and updated and dated in the chart. No family history on file.     Past Medical History:   Diagnosis Date    Gallstones       Social History     Socioeconomic History    Marital status:      Spouse name: Not on file    Number of children: Not on file    Years of education: Not on file    Highest education level: Not on file   Tobacco Use    Smoking status: Never Smoker    Smokeless tobacco: Never Used   Substance and Sexual Activity    Alcohol use: No     Frequency: Never    Drug use: No    Sexual activity: Yes        Review of Systems - negative except as listed above      Objective:     Vitals:    11/30/20 1513   BP: 119/72   Pulse: 74   SpO2: 100%   Weight: 196 lb 12.8 oz (89.3 kg)   Height: 5' 7\" (1.702 m)       Physical Examination: General appearance - alert, well appearing, and in no distress  Eyes - pupils equal and reactive, extraocular eye movements intact  Ears - bilateral TM's and external ear canals normal  Nose - mucosal erythema, clear rhinorrhea and sinuses normal and nontender  Mouth - mucous membranes moist, pharynx normal without lesions  Neck - supple, no significant adenopathy  Chest - clear to auscultation, no wheezes, rales or rhonchi, symmetric air entry  Heart - normal rate, regular rhythm, normal S1, S2, no murmurs, rubs, clicks or gallops  Abdomen - soft, nontender, nondistended, no masses or organomegaly  Skin - normal coloration and turgor, no rashes, no suspicious skin lesions noted     Assessment/ Plan:   Diagnoses and all orders for this visit:    1. Environmental and seasonal allergies    Other orders  -     azelastine-fluticasone (Dymista) 137-50 mcg/spray spry; 1 [place] by Nasal route daily. -     cetirizine (ZYRTEC) 10 mg tablet; Take 1 Tab by mouth daily. Pathophysiology, recovery and rehabilitation process discussed and questions answered   Counseling for 30 Minutes of the total visit duration   Pictures and figures used as necessary   Provided reassurance               I have discussed the diagnosis with the patient and the intended plan as seen in the above orders. The patient has received an after-visit summary and questions were answered concerning future plans. Medication Side Effects and Warnings were discussed with patient,  Patient Labs were reviewed and or requested, and  Patient Past Records were reviewed and or requested  yes     Pt agrees to call or return to clinic and/or go to closest ER with any worsening of symptoms. This may include, but not limited to increased fever (>100.4) with NSAIDS or Tylenol, increased edema, confusion, rash, worsening of presenting symptoms. Please note that this dictation was completed with [employer], the computer voice recognition software. Quite often unanticipated grammatical, syntax, homophones, and other interpretive errors are inadvertently transcribed by the computer software. Please disregard these errors. Please excuse any errors that have escaped final proofreading. Thank you.

## 2020-12-21 RX ORDER — ACETAMINOPHEN AND CODEINE PHOSPHATE 120; 12 MG/5ML; MG/5ML
SOLUTION ORAL
Qty: 28 TAB | Refills: 3 | Status: SHIPPED | OUTPATIENT
Start: 2020-12-21 | End: 2021-03-24 | Stop reason: SDUPTHER

## 2021-02-25 RX ORDER — AZELASTINE HYDROCHLORIDE, FLUTICASONE PROPIONATE 137; 50 UG/1; UG/1
SPRAY, METERED NASAL
Qty: 23 G | Refills: 1 | Status: SHIPPED | OUTPATIENT
Start: 2021-02-25 | End: 2021-06-04 | Stop reason: ALTCHOICE

## 2021-03-24 ENCOUNTER — OFFICE VISIT (OUTPATIENT)
Dept: INTERNAL MEDICINE CLINIC | Age: 36
End: 2021-03-24
Payer: MEDICAID

## 2021-03-24 VITALS
HEIGHT: 67 IN | SYSTOLIC BLOOD PRESSURE: 124 MMHG | HEART RATE: 102 BPM | RESPIRATION RATE: 16 BRPM | OXYGEN SATURATION: 99 % | WEIGHT: 209 LBS | TEMPERATURE: 98.1 F | BODY MASS INDEX: 32.8 KG/M2 | DIASTOLIC BLOOD PRESSURE: 83 MMHG

## 2021-03-24 DIAGNOSIS — M54.50 ACUTE BILATERAL LOW BACK PAIN WITHOUT SCIATICA: Primary | ICD-10-CM

## 2021-03-24 DIAGNOSIS — Z12.4 CERVICAL CANCER SCREENING: ICD-10-CM

## 2021-03-24 DIAGNOSIS — K21.9 GASTROESOPHAGEAL REFLUX DISEASE, UNSPECIFIED WHETHER ESOPHAGITIS PRESENT: ICD-10-CM

## 2021-03-24 PROCEDURE — 99214 OFFICE O/P EST MOD 30 MIN: CPT | Performed by: FAMILY MEDICINE

## 2021-03-24 RX ORDER — PREDNISONE 20 MG/1
20 TABLET ORAL 3 TIMES DAILY
Qty: 21 TAB | Refills: 0 | Status: SHIPPED | OUTPATIENT
Start: 2021-03-24 | End: 2021-03-31

## 2021-03-24 RX ORDER — ACETAMINOPHEN AND CODEINE PHOSPHATE 120; 12 MG/5ML; MG/5ML
SOLUTION ORAL
Qty: 28 TAB | Refills: 3 | Status: SHIPPED | OUTPATIENT
Start: 2021-03-24 | End: 2021-07-13 | Stop reason: SDUPTHER

## 2021-03-24 RX ORDER — FAMOTIDINE 40 MG/1
40 TABLET, FILM COATED ORAL DAILY
Qty: 30 TAB | Refills: 5 | Status: SHIPPED | OUTPATIENT
Start: 2021-03-24 | End: 2021-06-04 | Stop reason: ALTCHOICE

## 2021-03-24 NOTE — PROGRESS NOTES
Chief Complaint   Patient presents with    Back Pain    Heartburn     she is a 37y.o. year old female who presents for evaluation of Back pain   Pain Assessment Encounter      Remberto López  3/24/2021  Onset of Symptoms: Patient states she has had back pain for 5 days  ________________________________________________________________________  Description:Patient states no injures    Frequency: 5 times a day  Pain Scale:(1-10): 8  Trauma Hx: none  Hx of similar symptoms: No:   Radiation: YES, Arm, Neck  Duration:  continuous      Progression: has worsened  What makes it better?: heat, ice and OTC meds  What makes it worse?:exercise and lying down  Medications tried: acetaminophen, ibuprofen, aspirin    Reviewed and agree with Nurse Note and duplicated in this note. Reviewed PmHx, RxHx, FmHx, SocHx, AllgHx and updated and dated in the chart. No family history on file.     Past Medical History:   Diagnosis Date    Gallstones       Social History     Socioeconomic History    Marital status:      Spouse name: Not on file    Number of children: Not on file    Years of education: Not on file    Highest education level: Not on file   Tobacco Use    Smoking status: Never Smoker    Smokeless tobacco: Never Used   Substance and Sexual Activity    Alcohol use: No     Frequency: Never    Drug use: No    Sexual activity: Yes        Review of Systems - negative except as listed above      Objective:     Vitals:    03/24/21 0928   BP: 124/83   Pulse: (!) 102   Resp: 16   Temp: 98.1 °F (36.7 °C)   SpO2: 99%   Weight: 209 lb (94.8 kg)   Height: 5' 7\" (1.702 m)       Physical Examination: General appearance - alert, well appearing, and in no distress  Eyes - pupils equal and reactive, extraocular eye movements intact  Ears - bilateral TM's and external ear canals normal  Nose - normal and patent, no erythema, discharge or polyps  Mouth - mucous membranes moist, pharynx normal without lesions  Neck - supple, no significant adenopathy  Chest - clear to auscultation, no wheezes, rales or rhonchi, symmetric air entry  Heart - normal rate, regular rhythm, normal S1, S2, no murmurs, rubs, clicks or gallops  Abdomen - soft, nontender, nondistended, no masses or organomegaly  Back–negative laterally, pain with palpation of para lumbar musculature, pain with palpation of bilateral traps musculoskeletal - no joint tenderness, deformity or swelling  Extremities - peripheral pulses normal, no pedal edema, no clubbing or cyanosis  Skin - normal coloration and turgor, no rashes, no suspicious skin lesions noted       Assessment/ Plan:   Diagnoses and all orders for this visit:    1. Acute bilateral low back pain without sciatica    2. Gastroesophageal reflux disease, unspecified whether esophagitis present    3. Cervical cancer screening  -     REFERRAL TO OBSTETRICS AND GYNECOLOGY    Other orders  -     predniSONE (DELTASONE) 20 mg tablet; Take 20 mg by mouth three (3) times daily for 7 days.  -     famotidine (PEPCID) 40 mg tablet; Take 1 Tab by mouth daily.  -     norethindrone (MICRONOR) 0.35 mg tab; TAKE 1 TABLET BY MOUTH EVERY DAY         Pathophysiology, recovery and rehabilitation process discussed and questions answered   Counseling for 30 Minutes of the total visit duration   Pictures and figures used as necessary   Provided reassurance   Recommend activity modification   Recommend  lower impact activities-walking, Eliptical, Nordic Track, cycling or swimming   Follow up in 4 week(s)             1) Remember to stay active and/or exercise regularly (I suggest 30-45 minutes daily)   2) For reliable dietary information, go to www.EATRIGHT.org. You may wish to consider seeing the nutritionist at [facility] [phone], also consider the Mediterranean diet.      I have discussed the diagnosis with the patient and the intended plan as seen in the above orders.  The patient has received an after-visit summary and questions were  answered concerning future plans. Medication Side Effects and Warnings were discussed with patient,  Patient Labs were reviewed and or requested, and  Patient Past Records were reviewed and or requested  yes      Pt agrees to call or return to clinic and/or go to closest ER with any worsening of symptoms. This may include, but not limited to increased fever (>100.4) with NSAIDS or Tylenol, increased edema, confusion, rash, worsening of presenting symptoms. Please note that this dictation was completed with [employer], the computer voice recognition software. Quite often unanticipated grammatical, syntax, homophones, and other interpretive errors are inadvertently transcribed by the computer software. Please disregard these errors. Please excuse any errors that have escaped final proofreading. Thank you.

## 2021-06-04 ENCOUNTER — OFFICE VISIT (OUTPATIENT)
Dept: INTERNAL MEDICINE CLINIC | Age: 36
End: 2021-06-04
Payer: MEDICAID

## 2021-06-04 VITALS
BODY MASS INDEX: 32.88 KG/M2 | HEART RATE: 87 BPM | HEIGHT: 67 IN | DIASTOLIC BLOOD PRESSURE: 80 MMHG | WEIGHT: 209.5 LBS | SYSTOLIC BLOOD PRESSURE: 121 MMHG | OXYGEN SATURATION: 100 %

## 2021-06-04 DIAGNOSIS — Z01.419 WELL WOMAN EXAM: Primary | ICD-10-CM

## 2021-06-04 PROCEDURE — 99395 PREV VISIT EST AGE 18-39: CPT | Performed by: FAMILY MEDICINE

## 2021-06-04 NOTE — PROGRESS NOTES
Chief Complaint   Patient presents with    Complete Physical     she is a 37y.o. year old female who presents for CPE  Complete Physical Exam Questions:    LMP -  5/16/2021  Last Pap (q 3 years, or q5 with HPV) - 3 years, before giving birth in 2018  Last Mammogram (54-69 biennially)- n/a  Hx of abnl Pap - No    1. Do you follow a low fat diet? yes  2. Are you up to date on your Tdap (<10 years)? Unknown  3. Have you ever had a Pneumovax vaccine (>65)? Not applicable   IBB21 Not applicable   MEAX31 Not applicable  4. Have you had Zoster vaccine (>60)? Not applicable  5. Have you had the HPV - Gardasil (13- 26)? Not applicable  6. Do you follow an exercise program?  yes  7. Do you smoke?  no  8. Do you consider yourself overweight?  yes  9. Is there a family history of CAD< age 48? No  10. Is there a family history of Cancer?  no  11. Do you know your Cancer risks? Not applicable  12. Have you had a colonoscopy?  no  13. Have you been tested for HIV or other STI's? No HIV testing today(18-64 y/o)? No  14. Have had a bone density scan or DEXA done(>65)? Not applicable  15. Have you had an EKG performed in the last five years (>50)? Not applicable    Other complaints:    Reviewed and agree with Nurse Note and duplicated in this note. Reviewed PmHx, RxHx, FmHx, SocHx, AllgHx and updated and dated in the chart. History reviewed. No pertinent family history.     Past Medical History:   Diagnosis Date    Gallstones       Social History     Socioeconomic History    Marital status:      Spouse name: Not on file    Number of children: Not on file    Years of education: Not on file    Highest education level: Not on file   Tobacco Use    Smoking status: Never Smoker    Smokeless tobacco: Never Used   Substance and Sexual Activity    Alcohol use: No    Drug use: No    Sexual activity: Yes     Social Determinants of Health     Financial Resource Strain:     Difficulty of Paying Living Expenses:    Food Insecurity:     Worried About Running Out of Food in the Last Year:     [address] in the Last Year:    Transportation Needs:     Lack of Transportation (Medical):  Lack of Transportation (Non-Medical):    Physical Activity:     Days of Exercise per Week:     Minutes of Exercise per Session:    Stress:     Feeling of Stress :    Social Connections:     Frequency of Communication with Friends and Family:     Frequency of Social Gatherings with Friends and Family:     Attends Voodoo Services:     Active Member of Clubs or Organizations:     Attends Club or Organization Meetings:     Marital Status:         Review of Systems - negative except as listed above      Objective:     Vitals:    06/04/21 1115   BP: 121/80   Pulse: 87   SpO2: 100%   Weight: 95 kg (209 lb 8 oz)   Height: 5' 7\" (1.702 m)       Physical Examination: General appearance - alert, well appearing, and in no distress  Eyes - pupils equal and reactive, extraocular eye movements intact  Ears - bilateral TM's and external ear canals normal  Nose - normal and patent, no erythema, discharge or polyps  Mouth - mucous membranes moist, pharynx normal without lesions  Neck - supple, no significant adenopathy  Chest - clear to auscultation, no wheezes, rales or rhonchi, symmetric air entry  Heart - normal rate, regular rhythm, normal S1, S2, no murmurs, rubs, clicks or gallops  Abdomen - soft, nontender, nondistended, no masses or organomegaly  Musculoskeletal - no joint tenderness, deformity or swelling  Extremities - peripheral pulses normal, no pedal edema, no clubbing or cyanosis  Skin - normal coloration and turgor, no rashes, no suspicious skin lesions noted      Assessment/ Plan:   Diagnoses and all orders for this visit:    1. Well woman exam  -     CBC W/O DIFF; Future  -     LIPID PANEL; Future  -     METABOLIC PANEL, COMPREHENSIVE; Future  -     HEPATITIS C AB;  Future  -     REFERRAL TO OBSTETRICS AND GYNECOLOGY           Labs to be drawn: CBC, CMP, Lipid            I have discussed the diagnosis with the patient and the intended plan as seen in the above orders. The patient has received an after-visit summary and questions were answered concerning future plans. Medication Side Effects and Warnings were discussed with patient,  Patient Labs were reviewed and or requested, and  Patient Past Records were reviewed and or requested  yes         Pt agrees to call or return to clinic and/or go to closest ER with any worsening of symptoms. This may include, but not limited to increased fever (>100.4) with NSAIDS or Tylenol, increased edema, confusion, rash, worsening of presenting symptoms. Please note that this dictation was completed with Koding, the computer voice recognition software. Quite often unanticipated grammatical, syntax, homophones, and other interpretive errors are inadvertently transcribed by the computer software. Please disregard these errors. Please excuse any errors that have escaped final proofreading. Thank you.

## 2021-06-05 LAB
ALBUMIN SERPL-MCNC: 4.7 G/DL (ref 3.8–4.8)
ALBUMIN/GLOB SERPL: 1.7 {RATIO} (ref 1.2–2.2)
ALP SERPL-CCNC: 62 IU/L (ref 48–121)
ALT SERPL-CCNC: 17 IU/L (ref 0–32)
AST SERPL-CCNC: 14 IU/L (ref 0–40)
BILIRUB SERPL-MCNC: 0.3 MG/DL (ref 0–1.2)
BUN SERPL-MCNC: 11 MG/DL (ref 6–20)
BUN/CREAT SERPL: 13 (ref 9–23)
CALCIUM SERPL-MCNC: 9.8 MG/DL (ref 8.7–10.2)
CHLORIDE SERPL-SCNC: 103 MMOL/L (ref 96–106)
CHOLEST SERPL-MCNC: 117 MG/DL (ref 100–199)
CO2 SERPL-SCNC: 22 MMOL/L (ref 20–29)
CREAT SERPL-MCNC: 0.82 MG/DL (ref 0.57–1)
ERYTHROCYTE [DISTWIDTH] IN BLOOD BY AUTOMATED COUNT: 12.9 % (ref 11.7–15.4)
GLOBULIN SER CALC-MCNC: 2.7 G/DL (ref 1.5–4.5)
GLUCOSE SERPL-MCNC: 88 MG/DL (ref 65–99)
HCT VFR BLD AUTO: 41.5 % (ref 34–46.6)
HCV AB S/CO SERPL IA: <0.1 S/CO RATIO (ref 0–0.9)
HDLC SERPL-MCNC: 49 MG/DL
HGB BLD-MCNC: 13.1 G/DL (ref 11.1–15.9)
LDLC SERPL CALC-MCNC: 53 MG/DL (ref 0–99)
MCH RBC QN AUTO: 28.2 PG (ref 26.6–33)
MCHC RBC AUTO-ENTMCNC: 31.6 G/DL (ref 31.5–35.7)
MCV RBC AUTO: 89 FL (ref 79–97)
PLATELET # BLD AUTO: 192 X10E3/UL (ref 150–450)
POTASSIUM SERPL-SCNC: 4.2 MMOL/L (ref 3.5–5.2)
PROT SERPL-MCNC: 7.4 G/DL (ref 6–8.5)
RBC # BLD AUTO: 4.65 X10E6/UL (ref 3.77–5.28)
SODIUM SERPL-SCNC: 139 MMOL/L (ref 134–144)
TRIGL SERPL-MCNC: 71 MG/DL (ref 0–149)
VLDLC SERPL CALC-MCNC: 15 MG/DL (ref 5–40)
WBC # BLD AUTO: 7 X10E3/UL (ref 3.4–10.8)

## 2021-07-13 ENCOUNTER — OFFICE VISIT (OUTPATIENT)
Dept: OBGYN CLINIC | Age: 36
End: 2021-07-13
Payer: MEDICAID

## 2021-07-13 VITALS
DIASTOLIC BLOOD PRESSURE: 76 MMHG | WEIGHT: 213 LBS | SYSTOLIC BLOOD PRESSURE: 118 MMHG | BODY MASS INDEX: 34.23 KG/M2 | HEIGHT: 66 IN

## 2021-07-13 DIAGNOSIS — Z30.41 ENCOUNTER FOR SURVEILLANCE OF CONTRACEPTIVE PILLS: Primary | ICD-10-CM

## 2021-07-13 PROCEDURE — 99213 OFFICE O/P EST LOW 20 MIN: CPT | Performed by: ADVANCED PRACTICE MIDWIFE

## 2021-07-13 RX ORDER — ACETAMINOPHEN AND CODEINE PHOSPHATE 120; 12 MG/5ML; MG/5ML
SOLUTION ORAL
Qty: 3 DOSE PACK | Refills: 0 | Status: SHIPPED | OUTPATIENT
Start: 2021-07-13 | End: 2021-08-26

## 2021-07-13 NOTE — PROGRESS NOTES
Contraception    Srinivas Faust is a No obstetric history on file. ,  37 y.o. female   No LMP recorded. She presents for her annual checkup. She is having no significant problems and needs refill on birth control. With regard to the Gardasil vaccine, she has not received it yet. Pt here for annual exam today - started cycle 3 days ago and has a heavy flow. Needs refill on birth control     Menstrual status:    Her periods are normal in flow. She is using three to ten pads or tampons per day, usually regular with a 26-32 day interval with 3-7 day duration. She has dysmenorrhea. She reports no premenstrual symptoms. Contraception:    The current method of family planning is OCP. Sexual history:    She  reports being sexually active. Medical conditions:    Since her last annual GYN exam about three years ago, she has had the following changes in her health history: none. Surgical history confirmed with patient. has a past surgical history that includes hx gyn. Pap and Mammogram History:    Her most recent Pap smear was normal, obtained 3 year(s) ago. The patient has not had a recent mammogram.    Breast Cancer History/Substance Abuse: negative    Past Medical History:   Diagnosis Date    Gallstones      Past Surgical History:   Procedure Laterality Date    HX GYN         Current Outpatient Medications   Medication Sig Dispense Refill    norethindrone (MICRONOR) 0.35 mg tab TAKE 1 TABLET BY MOUTH EVERY DAY 28 Tab 3     Allergies: Patient has no known allergies. Tobacco History:  reports that she has never smoked. She has never used smokeless tobacco.  Alcohol Abuse:  reports no history of alcohol use. Drug Abuse:  reports no history of drug use. Family Medical/Cancer History: No family history on file.      Review of Systems - History obtained from the patient  Constitutional: negative for weight loss, fever, night sweats  HEENT: negative for hearing loss, earache, congestion, snoring, sorethroat  CV: negative for chest pain, palpitations, edema  Resp: negative for cough, shortness of breath, wheezing  GI: negative for change in bowel habits, abdominal pain, black or bloody stools  : negative for frequency, dysuria, hematuria, vaginal discharge  MSK: negative for back pain, joint pain, muscle pain  Breast: negative for breast lumps, nipple discharge, galactorrhea  Skin :negative for itching, rash, hives  Neuro: negative for dizziness, headache, confusion, weakness  Psych: negative for anxiety, depression, change in mood  Heme/lymph: negative for bleeding, bruising, pallor    Physical Exam    There were no vitals taken for this visit. Constitutional  · Appearance: well-nourished, well developed, alert, in no acute distress    HENT  · Head and Face: appears normal,   · Eyes: Sclera clear. Conjunctiva pink, no drainage. PEARLA      Neck  · Inspection/Palpation: normal appearance, no masses or tenderness  · Lymph Nodes: no lymphadenopathy present  · Thyroid: NSSC, NT    Chest  · Respiratory Effort: breathing unlabored  · Auscultation: normal breath sounds, LCTAB    Cardiovascular  · Heart:  · Auscultation: regular rate and rhythm without murmur    Breasts  · Inspection of Breasts: breasts symmetrical, no skin changes, no discharge present, nipple appearance normal, no skin retraction present  · Palpation of Breasts and Axillae: no masses present on palpation, no breast tenderness  · Axillary Lymph Nodes: no lymphadenopathy present    Gastrointestinal  · Abdominal Examination: abdomen non-tender to palpation, no masses present  · Hernias: no hernias identified  · CVA: Neg/NT Bilat    Genitourinary  · External Genitalia: EGBUS normal appearance for age,  no tenderness, inflammatory lesions, masses or atrophy present  · Vagina: normal vaginal vault without central or paravaginal defects, Physiologic discharge present, no inflammatory lesions or masses noted.   · Bladder: non-tender to palpation  · Urethra: appears normal  · Cervix: normal, No CMT   · Uterus: normal size, shape and consistency  · Adnexa: no adnexal tenderness or masses present bilat  · Perineum: perineum normal, no evidence of trauma, rashes or skin lesions present  · Anus: normal appearance without fissures, lesions or hemorrhoids present  · Inguinal Lymph Nodes: no lymphadenopathy present    Skin  · General Inspection: no rash, no lesions identified    Neurologic/Psychiatric  · Mental Status:  · Orientation: grossly oriented to person, place and time  · Mood and Affect: mood normal, affect appropriate    . Assessment:  Routine gynecologic examination  Her current medical status is satisfactory with no evidence of significant gynecologic issues. Plan:  Contraceptive management - refill micro nor for 3 months - return for pap smear between one to three months for year long script.      Pt and  VU and agreement     Counseled re: diet, exercise, healthy lifestyle    Gisselle Camarena CNM

## 2021-07-13 NOTE — PATIENT INSTRUCTIONS
Learning About Birth Control  What is birth control? Birth control is any method used to prevent pregnancy. Another word for birth control is contraception. If you have sex without birth control, there is a chance that you could get pregnant. This is true even if you have not started having periods yet or you are getting close to menopause. The only sure way to prevent pregnancy is to not have sex. But finding a good method of birth control that you are comfortable with can help you avoid an unplanned pregnancy. Be sure to tell your doctor about any health problems you have or medicines you take. He or she can help you choose the birth control method that is right for you. What are the types of birth control? There are many different kinds of birth control. Each has pros and cons. Learning about all the methods will help you find one that is right for you. · Long-acting reversible contraception (LARC) is the most effective reversible method you can use to prevent pregnancy. If you decide you want to get pregnant, you can have them removed. LARCs are implants and intrauterine devices (IUDs). While they are being used, they usually prevent pregnancy for years. ? Implants are placed under the skin of the arm. They release the hormone progestin and prevent pregnancy for about 3 years. ? IUDs are placed in the uterus by a doctor. There are two main types of IUDsthe copper IUD and the hormonal IUD. The hormonal IUD releases progestin. IUDs prevent pregnancy for 3 to 10 years, depending on the type. · Hormonal methods are very good at preventing pregnancy. Combination birth control pills (\"the pill\"), skin patches, and vaginal rings release the hormones estrogen and progestin. Shots, mini-pills, hormonal IUDs, and implants release progestin only. · Barrier methods generally do not prevent pregnancy as well as IUDs or hormonal methods do.  Barrier methods include condoms, diaphragms, cervical caps, and sponges. You must use barrier methods every time you have sex. · Natural family planning can work if you and your partner are very careful and you have a regular ovulation cycle. You will need to keep good records so you know when you are most likely to become pregnant (you are fertile). And during times you are fertile, you will need to not have sex or to use a barrier method. Natural family planning is also known as fertility awareness and the rhythm method. · Permanent birth control (sterilization) gives you lasting protection against pregnancy. A man can have a vasectomy, or a woman can have her tubes tied (tubal ligation). But this is only a good choice if you are sure that you don't want any (or any more) children. · Emergency contraception is a backup method to prevent pregnancy if you didn't use birth control or a condom breaks. The most effective emergency contraception is prescribed by a doctor. This includes the copper IUD (inserted by a doctor) or a prescription pill. You can also get emergency contraceptive pills without a prescription at most drugstores. How do you choose the best method? The best method of birth control is one that protects you every time you have sex. This usually depends on how well you use it. To find a method that will work best for you, think about:  · How well it works. Think about how important it is to you to avoid pregnancy. Then look at how well each method works. For example, if you plan to have a child soon anyway, you may not need a very reliable method. If you don't want children but feel it is wrong to end a pregnancy, choose a type of birth control that works very well. · How much effort it takes. For example, birth control pills may not be a good choice if you often forget to take medicine. Or, if you are not sure you will stop and use a barrier method each time you have sex, pick another method. · How much the method costs.  For example, condoms are cheap or free in some clinics. Some insurance companies cover the cost of prescription birth control. But cost can sometimes be misleading. An IUD costs a lot up front. But it works for years, making it low-cost over time. · Whether it protects you from infection. Latex condoms can help protect you from sexually transmitted infections (STIs), such as herpes or HIV/AIDS. But they are not the best way to prevent pregnancy. To avoid both STIs and pregnancy, use condoms along with another type of birth control. · Whether you've had a problem with one kind of birth control. Finding the best method of birth control may involve trying something different. Also, you may need to change a method that once worked well for you. · Whether you want children. If you are positive you don't want children, a lasting method of birth control might be best.  · Your health issues. Some birth control methods may not be safe for you, depending on your health issues. For example, women who smoke, are breastfeeding, or have had breast cancer may not be able to use certain methods. How can you get birth control? · You can buy:  ? Condoms, sponges, and spermicides without a prescription in drugstores, online, and in many grocery stores. ? Some forms of emergency contraception without a prescription at most drugstores. · You need to see a doctor or visit a family planning clinic to:  ? Get a prescription for birth control pills and other methods that use hormones. ? Have an implant or IUD inserted, including the type of IUD used for emergency contraception. ? Get a hormone shot. ? Get a prescription for a diaphragm or cervical cap. ? Get a prescription for certain kinds of emergency contraception. Where can you learn more? Go to http://www.gray.com/  Enter M465 in the search box to learn more about \"Learning About Birth Control. \"  Current as of: October 8, 2020               Content Version: 12.8  © 5990-0432 [employer]. Care instructions adapted under license by [employer] (which disclaims liability or warranty for this information). If you have questions about a medical condition or this instruction, always ask your healthcare professional. [address] any warranty or liability for your use of this information.

## 2021-08-24 RX ORDER — AZELASTINE 1 MG/ML
1 SPRAY, METERED NASAL 2 TIMES DAILY
Qty: 1 BOTTLE | Refills: 0 | Status: SHIPPED | OUTPATIENT
Start: 2021-08-24

## 2021-08-26 RX ORDER — ACETAMINOPHEN AND CODEINE PHOSPHATE 120; 12 MG/5ML; MG/5ML
SOLUTION ORAL
Qty: 84 TABLET | Refills: 2 | Status: SHIPPED | OUTPATIENT
Start: 2021-08-26 | End: 2022-04-01

## 2021-08-26 RX ORDER — ACETAMINOPHEN AND CODEINE PHOSPHATE 120; 12 MG/5ML; MG/5ML
SOLUTION ORAL
Qty: 3 DOSE PACK | Refills: 0 | Status: SHIPPED | OUTPATIENT
Start: 2021-08-26 | End: 2022-04-01 | Stop reason: SDUPTHER

## 2022-02-09 ENCOUNTER — TELEPHONE (OUTPATIENT)
Dept: OBGYN CLINIC | Age: 37
End: 2022-02-09

## 2022-02-09 NOTE — TELEPHONE ENCOUNTER
Call received at 11:45am      31year old patient last seen in the office on 7/13/2021    HIPPA verified to speak to  regarding patient    Calling to set up next appointment for ae and pap smear      Patient was placed on the schedule to be seen on 3/2/2022 at 1:20PM ( patient to arrive at 1:00pm)     advised that she should have refill of ocp and to check with the pharmacy     verbalized understanding.

## 2022-03-18 PROBLEM — E66.01 SEVERE OBESITY (HCC): Status: ACTIVE | Noted: 2019-11-12

## 2022-03-31 NOTE — PATIENT INSTRUCTIONS
ÒíÇÑÉ ÇáÝÍÕ ÇáØÈí ÇáÚÇã¡ ãä Óä 18 ÚÇãðÇ Åáì 50 ÚÇãðÇ: ÅÑÔÇÏÇÊ ÇáÑÚÇíÉ  Well Visit, Ages 25 to 48: Care Instructions  äÙÑÉ ÚÇãÉ  íãßä Ãä ÊÓÇÚÏß ÒíÇÑÇÊ ÇáÝÍÕ Úáì ÇáÍÝÇÙ Úáì ÕÍÊß? . ÝÍÕ ØÈíÈß ÕÍÊß ÇáÚÇãÉ æÑÈãÇ ÇÞÊÑÍ ØÑÞÇ ááÚäÇíÉ ÈäÝÓß ÌíÏÇ? Sasha Maid ÞÏ íæÕí ØÈíÈß ÃíÖÇ ÈÅÌÑÇÁ [id number] Ýí ÇáÈíÊ¡ íãßäß ÇáãÓÇÚÏÉ Ýí ÇáæÞÇíÉ ãä ÇáãÑÖ Úä ØÑíÞ ÊäÇæá ÇáØÚÇã ÇáÕÍí æããÇÑÓÉ ÇáÊãÇÑíä ÇáÑíÇÖíÉ ÈÇäÊÙÇã æÛíÑåÇ ãä ÇáÎØæÇÊ? .  ßíÝ ÊÚÊäí ÈäÝÓß Ýí BQXRZP¿   ÇÎÖÚ [id number] ÇáÝÍÕ ÇáÊí ÊÞÑÑåÇ ÃäÊ æØÈíÈß? Sasha Maid íÓÇÚÏ ÇáÝÍÕ Ýí ßÔÝ CJJSZOO ÞÈá ÙåæÑ Ãí ÃÚÑÇÖ? Sasha Maid  ÊäÇæá ÇáÃØÚãÉ ÇáÕÍíÉ ÇÎÊÑ ÇáÝæÇßå¡ [id number] æÇáÍÈæÈ VXLUGXP¡ æÇáÈÑæÊíä¡ æãäÊÌÇÊ ÇáÃáÈÇä ÞáíáÉ ÇáÏÓã? Ani Bunting ÇáÏåæä¡ æÎÇÕÉ ÇáÏåæä ÇáãÔÈÚÉ? Margi Stephon ÇáãáÍ Ýí ÇáäÙÇã ÇáÛÐÇÆí? [place] ãä PLFCIY? Sasha Maid ÅÐÇ ßäÊ ÑÌáÇ¡ áÇ ÊÔÑÈ ÃßËÑ ãä ãÔÑæÈíä Ýí Çáíæã Ãæ 14 ãÔÑæÈÇ Ýí ÇáÃÓÈæÚ? Sasha Maid ÅÐÇ ßäÊ ÇãÑÃÉ¡ áÇ ÊÔÑÈí ÃßËÑ ãä ãÔÑæÈ æÇÍÏ Ýí Çáíæã Ãæ 7 ãÔÑæÈÇÊ Ýí ÇáÃÓÈæÚ? Sasha Maid  ãÇÑÓ 30 ÏÞíÞÉ Úáì ÇáÃÞá ãä ÇáäÔÇØ ÇáÈÏäí Ýí ãÚÙã ÃíÇã ÇáÃÓÈæÚ? Jesica Setters ÎíÇÑ ÍÓä? ÞÏ ÊÑÛÈ ÃíÖÇ Ýí ããÇÑÓÉ ÃäÔØÉ ÃÎÑì¡ ãËá ÇáÌÑí Ãæ [id number] Ãæ ÑßæÈ ÇáÏÑÇÌÇÊ Ãæ áÚÈ ÇáÊäÓ Ãæ ÇáÑíÇÖÇÊ ÇáÌãÇÚíÉ? Sasha Maid äÇÞÔ Ãí ÊÛííÑÇÊ Úáì ÈÑäÇãÌ ÇáÊãÑíä ãÚ ØÈíÈß? .   ÇÈáÛ æÍÇÝÙ Úáì æÒä ÕÍí ÓíÄÏí Ðáß Åáì ÊÞáíá ÎØÑ ÇáÅÕÇÈÉ ÈÇáÚÏíÏ ãä YCAGIRT¡ ãËá ÇáÓãäÉ æÇáÓßÑí æÃãÑÇÖ ÇáÞáÈ æÇÑÊÝÇÚ ÖÛØ ÇáÏã? Sasha Maid  áÇ ÊÏÎä Ãæ ÊÓãÍ ááÂÎÑíä ÈÇáÊÏÎíä ãä Íæáß? Sasha Maid ÅÐÇ ÇÍÊÌÊ Åáì ãÓÇÚÏÉ ááÅÞáÇÚ Úä ÇáÊÏÎíä¡ ÝÊÍÏË Åáì ØÈíÈß Úä ÈÑÇãÌ ÇáÅÞáÇÚ Úä ÇáÊÏÎíä æÇáÚÞÇÞíÑ? . ÞÏ ÊÒíÏ Êáß ãä ÝÑÕß Ýí ÇáÅÞáÇÚ Úä ÇáÊÏÎíä Åáì ÇáÃÈÏ? Sasha Maid Treinta Y Markie 2070 LEWBSGL? Sultana Bible ÇáÓåá [id number] Åáì ÇáÞáÞ æÇáÅÌåÇÏ? . ÊÚáã ÅÓÊÑÇÊíÌíÇÊ áÅÏÇÑÉ ÇáÅÌåÇÏ¡ ãËá ÇáÊäÝÓ ÇáÚãíÞ [id number] ÇáæÇÚí¡ æÇáÈÞÇÁ Úáì ÇÊÕÇá SXNYZHC æãÍíØß? Sasha Maid ÅÐÇ æÌÏÊ Ãäß ÊÔÚÑ CTSUEN Ãæ ÇáíÃÓ¡ ÝÚáíß ÇáÊÍÏË ãÚ ØÈíÈß? Erwin Manges íäÝÚ ÇáÚáÇÌ? .   Úáíß ÇáÊÍÏË ãÚ ØÈíÈß ÚãÇ ÅÐÇ ßÇä ÚäÏß Ãí ÚæÇãá ÎØÑ ááÃãÑÇÖ GUZUTOZE ÌäÓíÇ? Sasha Maid íãßäß ÇáãÓÇÚÏÉ Ýí ÇáæÞÇíÉ ãä ÇáÃãÑÇÖ SGLDUJSJ ÌäÓíÇ ÅÐÇ ÇäÊÙÑÊ ÞÈá ããÇÑÓÉ ÇáÌäÓ ãÚ ÔÑíß ÌÏíÏ (Ãæ ÔÑßÇÁ) ÍÊì íÌÑí ßáÇßãÇ ÇÎÊÈÇÑÇ ááßÔÝ Úä ÇáÃãÑÇÖ LNAMFTXU ÌäÓíÇ? Sasha Maid  ßãÇ Tra Isaiah IYNECEI FVVPXA (ÇáæÇÞí ÇáÐßÑí Ãæ ÇáÃäËæí) æÅÐÇ ßäÊ ÊÞÊÕÑ Úáì ÔÑíß æÇÍÏ áÇ íãÇÑÓ ÇáÌäÓ ãÚ ÛíÑß? Rocky Settler æÊÊæÝÑ ÇááÞÇÍÇÊ áÈÚÖ ÇáÃãÑÇÖ ÇáãäÞæáÉ ÌäÓíÇ¡ ãËá ÝíÑæÓ ÇáæÑã ÇáÍáíãí ÇáÈÔÑí? [place] Settler Ashli Heidy æÓÇÆá ãäÚ BCQOV ÅÐÇ ßÇä ãä Çáãåã UREJTIM áß ãäÚ LHSLN? . Úáíß ÇáÊÍÏË ãÚ ØÈíÈß Úä ÇáÎíÇÑÇÊ ÇáãÊÇÍÉ æãÇ ÞÏ íßæä ÃÝÖá ÈÇáäÓÈÉ áß? .   ÅÐÇ ßäÊ ÊÙä Ãäß ÞÏ ÊæÇÌå ãÔßáÉ ãÚ ÊÚÇØí TKJHCD Ãæ ÇáÚÞÇÞíÑ¡ ÝÊÍÏË Åáì ØÈíÈß? . æíÔãá Ðáß ÇáÃÏæíÉ ÇáãæÕæÝÉ ØÈíÇ (ãËá ÇáÃãÝíÊÇãíäÇÊ æÇáãæÇÏ ÇáÃÝíæäíÉ) æÇáÚÞÇÞíÑ ÛíÑ ÇáãÔÑæÚÉ (ãËá ÇáßæßÇííä æÇáãíËÇãÝíÊÇãíä)? . íãßä áØÈíÈß ãÓÇÚÏÊß Ýí ãÚÑÝÉ äæÚ ÇáÚáÇÌ ÇáÃäÓÈ áß? .   ÇÍã ÈÔÑÊß ãä ÇáÊÚÑÖ ÇáãÝÑØ áÃÔÚÉ ÇáÔãÓ ÚäÏãÇ ÊÎÑÌ ãä 10 ÕÈÇÍÇ Åáì 4 ãÓÇÁ¡ ÇãßË Ýí ÇáÙá Ãæ ÊÛØ SKSFFJGR æÇÑÊÏí ÞÈÚÉ ÐÇÊ ÍÇÝÉ æÇÓÚÉ? Ilah Decant ÇáäÙÇÑÇÊ ÇáÔãÓíÉ ÇáÊí ÊãäÚ ÇáÃÔÚÉ ÝæÞ ÇáÈäÝÓÌíÉ? . ÍÊì ÚäÏãÇ íßæä ÇáÌæ ÛÇÆãÇ¡ ÖÚ æÇÞí ÇáÔãÓ æÇÓÚ ÇáØíÝ? (SPF 30 ? Ãæ ÃÚáì? ) ? Izabella Coad ÌáÏ ãßÔæÝ? .   Úáíß ÚíÇÏÉ ØÈíÈ ÇáÃÓäÇä ãÑÉ Ãæ ãÑÊíä Ýí ÇáÓäÉ áÅÌÑÇÁ [id number] æÊäÙíÝ ÃÓäÇäß? Rocky Settler  ÇÑÊÏ ÍÒÇã ÇáÃãÇä Ýí ÇáÓíÇÑÉ? .  ãÊì íäÈÛí Úáíßö [id number] áØáÈ ÇáãÓÇÚÏÉ¿  ÑÇÞÈ Úä ßËÈ ÇáÊÛííÑÇÊ ÇáÊí ÊÍÏË Ýí ÕÍÊß¡ æÇÍÑÕ Úáì ÇáÇÊÕÇá ÈÇáØÈíÈ ÅÐÇ ßäÊ ÊÚÇäí ãä Ãí ãÔÇßá Ãæ ÃÚÑÇÖ ÊËíÑ ÞáÞß. Ãíä íãßäß ãÚÑÝÉ ÇáãÒíÏ¿  ÇäÊÞÇá Åáì   [email or web address]  ÃÏÎá P0 Ýí ãÑÈÚ ÇáÈÍË áãÚÑÝÉ ÇáãÒíÏ Íæá \"ÒíÇÑÉ ÇáÝÍÕ ÇáØÈí ÇáÚÇã¡ ãä Óä 18 ÚÇãðÇ Åáì 50 ÚÇãðÇ: ÅÑÔÇÏÇÊ ÇáÑÚÇíÉ. \"  ÓÇÑò WCWPEOGE ãä: 6 ÊÔÑíä ÇáÃæá 2021               äÓÎÉ ÇáãÍÊæì: 13.2  © 2574-4835 [employer]. Êã ÊÚÏíá ÅÑÔÇÏÇÊ ÇáÑÚÇíÉ ÈãæÌÈ ÊÑÎíÕ ÕÇÏÑ ãä ÇÎÊÕÇÕí ÇáÑÚÇíÉ ÇáÕÍíÉ ÇáÎÇÕ Èß. ÅÐÇ ßÇäÊ áÏíß ÃÓÆáÉ WQAPD ÈÍÇáÉ ãÑÖíÉ Ãæ ÈåÐå ÇáÊÚáíãÇÊ¡ ÝÇÍÑÕ Úáì ÇáÑÌæÚ ÏÇÆãðÇ Åáì ÇÎÊÕÇÕí ÇáÑÚÇíÉ ÇáÕÍíÉ. ÊõÎáí ÔÑßÉ Help/Systems [id number] Úä Ãí ÖãÇä Ãæ ÇáÊÒÇã íÊÚáÞ [id number] áåÐå [id number].

## 2022-03-31 NOTE — PROGRESS NOTES
Annual exam ages 21-44    Kalin Amador is a No obstetric history on file. ,  31 y.o. female   No LMP recorded. She presents for her annual checkup. She is having no significant problems. With regard to the Gardasil vaccine, she is older than the FDA approved age to receive it. Menstrual status:    Her periods are normal in flow. She is using one to three pads or tampons per day, usually regular with a 26-32 day interval with 3-7 day duration. She does not have dysmenorrhea. She reports no premenstrual symptoms. Contraception:    The current method of family planning is Progesterone only pill. Sexual history:    She  reports being sexually active and has had partner(s) who are male. She reports using the following method of birth control/protection: Pill. Medical conditions:    Since her last annual GYN exam about one year ago, she has had the following changes in her health history: none. Surgical history confirmed with patient. has a past surgical history that includes hx gyn and hx  section (). Pap and Mammogram History:    Her most recent Pap smear- not on file    The patient has never had a mammogram.    Breast Cancer History/Substance Abuse: negative    Past Medical History:   Diagnosis Date    Gallstones      Past Surgical History:   Procedure Laterality Date    HX  SECTION      HX GYN         Current Outpatient Medications   Medication Sig Dispense Refill    norethindrone (MICRONOR) 0.35 mg tab TAKE 1 TABLET BY MOUTH EVERY DAY 84 Tablet 2    norethindrone (MICRONOR) 0.35 mg tab TAKE 1 TABLET BY MOUTH EVERY DAY 3 Dose Pack 0    azelastine (ASTELIN) 137 mcg (0.1 %) nasal spray 1 [place] by Both Nostrils route two (2) times a day. Use in each nostril as directed 1 Bottle 0     Allergies: Patient has no known allergies. Tobacco History:  reports that she has never smoked.  She has never used smokeless tobacco.  Alcohol Abuse:  reports no history of alcohol use. Drug Abuse:  reports no history of drug use. Family Medical/Cancer History:   Family History   Problem Relation Age of Onset    Hypertension Father         Review of Systems - History obtained from the patient  Constitutional: negative for weight loss, fever, night sweats  HEENT: negative for hearing loss, earache, congestion, snoring, sorethroat  CV: negative for chest pain, palpitations, edema  Resp: negative for cough, shortness of breath, wheezing  GI: negative for change in bowel habits, abdominal pain, black or bloody stools  : negative for frequency, dysuria, hematuria, vaginal discharge  MSK: negative for back pain, joint pain, muscle pain  Breast: negative for breast lumps, nipple discharge, galactorrhea  Skin :negative for itching, rash, hives  Neuro: negative for dizziness, headache, confusion, weakness  Psych: negative for anxiety, depression, change in mood  Heme/lymph: negative for bleeding, bruising, pallor    Physical Exam    There were no vitals taken for this visit. Constitutional  · Appearance: well-nourished, well developed, alert, in no acute distress    HENT  · Head and Face: appears normal,   · Eyes: Sclera clear. Conjunctiva pink, no drainage.  PEARLA      Neck  · Inspection/Palpation: normal appearance, no masses or tenderness  · Lymph Nodes: no lymphadenopathy present  · Thyroid: NSSC, NT    Chest  · Respiratory Effort: breathing unlabored  · Auscultation: normal breath sounds, LCTAB    Cardiovascular  · Heart:  · Auscultation: regular rate and rhythm without murmur    Breasts  · Inspection of Breasts: breasts symmetrical, no skin changes, no discharge present, nipple appearance normal, no skin retraction present  · Palpation of Breasts and Axillae: no masses present on palpation, no breast tenderness  · Axillary Lymph Nodes: no lymphadenopathy present    Gastrointestinal  · Abdominal Examination: abdomen non-tender to palpation, no masses present  · Hernias: no hernias identified  · CVA: Neg/NT Bilat    Genitourinary  · External Genitalia: EGBUS normal appearance for age,  no tenderness, inflammatory lesions, masses or atrophy present  · Vagina: normal vaginal vault without central or paravaginal defects, Physiologic discharge present, no inflammatory lesions or masses noted. · Bladder: non-tender to palpation  · Urethra: appears normal  · Cervix: normal, No CMT   · Uterus: normal size, shape and consistency  · Adnexa: no adnexal tenderness or masses present bilat  · Perineum: perineum normal, no evidence of trauma, rashes or skin lesions present  · Anus: normal appearance without fissures, lesions or hemorrhoids present  · Inguinal Lymph Nodes: no lymphadenopathy present    Skin  · General Inspection: no rash, no lesions identified    Neurologic/Psychiatric  · Mental Status:  · Orientation: grossly oriented to person, place and time  · Mood and Affect: mood normal, affect appropriate    . Assessment:  Routine gynecologic examination  Her current medical status is satisfactory with no evidence of significant gynecologic issues. Plan:  Micronor 3 pks 3 RF  Pap with Co-testing collected  Counseled re: diet, exercise, healthy lifestyle, safe sex practices and STI prevention  Offered LARC for Contraception  Return for yearly wellness visits or prn  Gardisil counseling provided  Pt counseled regarding co-testing for high risk HPV with pap  Mammogram Screening recommendation starting age 36   Colorectal Screening recommendation starting age 39    [address] [facility], [place]

## 2022-04-01 ENCOUNTER — OFFICE VISIT (OUTPATIENT)
Dept: OBGYN CLINIC | Age: 37
End: 2022-04-01
Payer: MEDICAID

## 2022-04-01 VITALS
DIASTOLIC BLOOD PRESSURE: 74 MMHG | BODY MASS INDEX: 35.03 KG/M2 | SYSTOLIC BLOOD PRESSURE: 118 MMHG | WEIGHT: 218 LBS | HEIGHT: 66 IN

## 2022-04-01 DIAGNOSIS — Z01.419 ENCOUNTER FOR WELL WOMAN EXAM WITH ROUTINE GYNECOLOGICAL EXAM: Primary | ICD-10-CM

## 2022-04-01 PROCEDURE — 99395 PREV VISIT EST AGE 18-39: CPT | Performed by: ADVANCED PRACTICE MIDWIFE

## 2022-04-01 RX ORDER — ACETAMINOPHEN AND CODEINE PHOSPHATE 120; 12 MG/5ML; MG/5ML
SOLUTION ORAL
Qty: 3 DOSE PACK | Refills: 3 | Status: SHIPPED | OUTPATIENT
Start: 2022-04-01

## 2022-04-08 LAB
CYTOLOGIST CVX/VAG CYTO: NORMAL
CYTOLOGY CVX/VAG DOC CYTO: NORMAL
CYTOLOGY CVX/VAG DOC THIN PREP: NORMAL
DX ICD CODE: NORMAL
HPV I/H RISK 4 DNA CVX QL PROBE+SIG AMP: NEGATIVE
Lab: NORMAL
Lab: NORMAL
OTHER STN SPEC: NORMAL
STAT OF ADQ CVX/VAG CYTO-IMP: NORMAL

## 2022-12-15 ENCOUNTER — OFFICE VISIT (OUTPATIENT)
Dept: OBGYN CLINIC | Age: 37
End: 2022-12-15
Payer: MEDICAID

## 2022-12-15 VITALS
WEIGHT: 233.4 LBS | SYSTOLIC BLOOD PRESSURE: 118 MMHG | DIASTOLIC BLOOD PRESSURE: 80 MMHG | BODY MASS INDEX: 37.51 KG/M2 | HEIGHT: 66 IN

## 2022-12-15 DIAGNOSIS — Z30.09 ENCOUNTER FOR OTHER GENERAL COUNSELING OR ADVICE ON CONTRACEPTION: Primary | ICD-10-CM

## 2023-05-17 RX ORDER — ACETAMINOPHEN AND CODEINE PHOSPHATE 120; 12 MG/5ML; MG/5ML
1 SOLUTION ORAL DAILY
COMMUNITY
Start: 2023-04-07

## 2024-03-27 NOTE — PROGRESS NOTES
Amaris Callahan is a 31 y.o. female who would like to discontinue her OCP's. She will be traveling for 4-6 mos without her . Her current method of family planning is OCP (estrogen/progesterone). The patient is sexually active. She will be traveling out of the country for several months and her spouse will not be accompanying her. She plans to resume OCP's once she returns home. Her relevant past medical history:   Past Medical History:   Diagnosis Date    Gallstones         Past Surgical History:   Procedure Laterality Date    HX  SECTION  1    HX GYN       Social History     Occupational History    Not on file   Tobacco Use    Smoking status: Never    Smokeless tobacco: Never   Substance and Sexual Activity    Alcohol use: No    Drug use: No    Sexual activity: Yes     Partners: Male     Birth control/protection: Pill     Family History   Problem Relation Age of Onset    Hypertension Father        No Known Allergies  Prior to Admission medications    Medication Sig Start Date End Date Taking? Authorizing Provider   norethindrone (MICRONOR) 0.35 mg tab TAKE 1 TABLET BY MOUTH EVERY DAY 22  Yes Stephan [facility], CN   azelastine (ASTELIN) 137 mcg (0.1 %) nasal spray 1 [place] by Both Nostrils route two (2) times a day.  Use in each nostril as directed  Patient not taking: Reported on 12/15/2022 8/24/21   Johana Armas MD        Review of Systems - History obtained from the patient  Constitutional: negative for weight loss, fever, night sweats  HEENT: negative for hearing loss, earache, congestion, snoring, sorethroat  CV: negative for chest pain, palpitations, edema  Resp: negative for cough, shortness of breath, wheezing  Breast: negative for breast lumps, nipple discharge, galactorrhea  GI: negative for change in bowel habits, abdominal pain, black or bloody stools  : negative for frequency, dysuria, hematuria  MSK: negative for back pain, joint pain, muscle pain  Skin: negative for itching, rash, hives  Neuro: negative for dizziness, headache, confusion, weakness  Psych: negative for anxiety, depression, change in mood  Heme/lymph: negative for bleeding, bruising, pallor      Objective:  Visit Vitals  /80   Ht 5' 6\" (1.676 m)   Wt 233 lb 6.4 oz (105.9 kg)   BMI 37.67 kg/m²          PHYSICAL EXAMINATION    Constitutional  Appearance: well-nourished, well developed, alert, in no acute distress    HENT  Head and Face: appears normal      Skin  General Inspection: no rash, no lesions identified    Neurologic/Psychiatric  Mental Status:  Orientation: grossly oriented to person, place and time  Mood and Affect: mood normal, affect appropriate    Assessment:      Contraceptive management    Plan:   Stop pills @ end of pack, restart a few days prior to return  Appointment conducted through  # [id number] Monika  Patient declines presence of chaperone during today's visit. Shayna Lentz.  MEKHI [place]LE Urinary tract infection with hematuria

## 2025-07-08 ENCOUNTER — OFFICE VISIT (OUTPATIENT)
Facility: CLINIC | Age: 40
End: 2025-07-08

## 2025-07-08 VITALS
DIASTOLIC BLOOD PRESSURE: 83 MMHG | OXYGEN SATURATION: 99 % | BODY MASS INDEX: 36.32 KG/M2 | RESPIRATION RATE: 16 BRPM | WEIGHT: 226 LBS | HEART RATE: 90 BPM | TEMPERATURE: 98.1 F | SYSTOLIC BLOOD PRESSURE: 127 MMHG | HEIGHT: 66 IN

## 2025-07-08 DIAGNOSIS — Z00.00 WELL WOMAN EXAM (NO GYNECOLOGICAL EXAM): Primary | ICD-10-CM

## 2025-07-08 DIAGNOSIS — B36.0 TINEA VERSICOLOR: ICD-10-CM

## 2025-07-08 RX ORDER — TERBINAFINE HYDROCHLORIDE 250 MG/1
250 TABLET ORAL DAILY
Qty: 14 TABLET | Refills: 0 | Status: SHIPPED | OUTPATIENT
Start: 2025-07-08 | End: 2025-07-22

## 2025-07-08 RX ORDER — ACETAMINOPHEN AND CODEINE PHOSPHATE 120; 12 MG/5ML; MG/5ML
1 SOLUTION ORAL DAILY
Qty: 30 TABLET | Refills: 0 | Status: SHIPPED | OUTPATIENT
Start: 2025-07-08

## 2025-07-08 SDOH — ECONOMIC STABILITY: FOOD INSECURITY: WITHIN THE PAST 12 MONTHS, THE FOOD YOU BOUGHT JUST DIDN'T LAST AND YOU DIDN'T HAVE MONEY TO GET MORE.: NEVER TRUE

## 2025-07-08 SDOH — ECONOMIC STABILITY: FOOD INSECURITY: WITHIN THE PAST 12 MONTHS, YOU WORRIED THAT YOUR FOOD WOULD RUN OUT BEFORE YOU GOT MONEY TO BUY MORE.: NEVER TRUE

## 2025-07-08 ASSESSMENT — PATIENT HEALTH QUESTIONNAIRE - PHQ9
SUM OF ALL RESPONSES TO PHQ QUESTIONS 1-9: 0
1. LITTLE INTEREST OR PLEASURE IN DOING THINGS: NOT AT ALL
2. FEELING DOWN, DEPRESSED OR HOPELESS: NOT AT ALL
SUM OF ALL RESPONSES TO PHQ QUESTIONS 1-9: 0

## 2025-07-08 NOTE — PROGRESS NOTES
true   Transportation Needs: No Transportation Needs (7/8/2025)    PRAPARE - Transportation     Lack of Transportation (Medical): No     Lack of Transportation (Non-Medical): No   Housing Stability: Low Risk  (7/8/2025)    Housing Stability Vital Sign     Unable to Pay for Housing in the Last Year: No     Number of Times Moved in the Last Year: 0     Homeless in the Last Year: No        Review of Systems - negative except as listed above      Objective:     Vitals:    07/08/25 1427   BP: 127/83   Pulse: 90   Resp: 16   Temp: 98.1 °F (36.7 °C)   SpO2: 99%   Weight: 102.5 kg (226 lb)   Height: 1.676 m (5' 6\")       Physical Examination:    Physical Exam  General Appearance: Normal.    HEENT: Within normal limits.  Respiratory: Clear to auscultation, no wheezing, rales, or rhonchi.  Cardiovascular: Regular rate and rhythm, no murmurs, rubs, or gallops.  Gastrointestinal: Within normal limits.  Lymphatic: Within normal limits.  Back: Within normal limits.  Musculoskeletal: Within normal limits.  Extremities: Within normal limits.  Skin: Fungal rash noted.  Neurological: Normal.  Psychiatric: Within normal limits.    Results      Assessment/ Plan:     Assessment & Plan  1. Birth control.  - Prescription for Micronor will be sent to the pharmacy.  - Temporary prescription provided until OB/GYN appointment next month.  - Follow-up for Pap smear in 6 months.    2. Tinea versicolor.  - Rash appears to be tinea versicolor.  - Prescribe 10-day course of antifungal medication. Refer to dermatologist if no improvement.    3. Athlete's foot.  - Diagnosed with athlete's foot.  - Same antifungal medication prescribed for tinea versicolor will be used.    4. Health maintenance.  - Blood work to check blood sugar levels and other parameters.  - Results available by tomorrow.    1. Well woman exam (no gynecological exam)  -     CBC with Auto Differential; Future  -     Lipid Panel; Future  -     HIV 1/2 Ag/Ab, 4TH Generation,W Rflx

## 2025-07-10 LAB
ALBUMIN SERPL-MCNC: 4.2 G/DL (ref 3.9–4.9)
ALP SERPL-CCNC: 62 IU/L (ref 44–121)
ALT SERPL-CCNC: 141 IU/L (ref 0–32)
AST SERPL-CCNC: 76 IU/L (ref 0–40)
BASOPHILS # BLD AUTO: 0.1 X10E3/UL (ref 0–0.2)
BASOPHILS NFR BLD AUTO: 1 %
BILIRUB SERPL-MCNC: 0.2 MG/DL (ref 0–1.2)
BUN SERPL-MCNC: 9 MG/DL (ref 6–20)
BUN/CREAT SERPL: 11 (ref 9–23)
CALCIUM SERPL-MCNC: 10 MG/DL (ref 8.7–10.2)
CHLORIDE SERPL-SCNC: 105 MMOL/L (ref 96–106)
CHOLEST SERPL-MCNC: 145 MG/DL (ref 100–199)
CO2 SERPL-SCNC: 21 MMOL/L (ref 20–29)
CREAT SERPL-MCNC: 0.81 MG/DL (ref 0.57–1)
EGFRCR SERPLBLD CKD-EPI 2021: 96 ML/MIN/1.73
EOSINOPHIL # BLD AUTO: 0.3 X10E3/UL (ref 0–0.4)
EOSINOPHIL NFR BLD AUTO: 4 %
ERYTHROCYTE [DISTWIDTH] IN BLOOD BY AUTOMATED COUNT: 14.3 % (ref 11.7–15.4)
GLOBULIN SER CALC-MCNC: 3.2 G/DL (ref 1.5–4.5)
GLUCOSE SERPL-MCNC: 104 MG/DL (ref 70–99)
HCT VFR BLD AUTO: 41.6 % (ref 34–46.6)
HDLC SERPL-MCNC: 47 MG/DL
HGB BLD-MCNC: 12.5 G/DL (ref 11.1–15.9)
HIV 1+2 AB+HIV1 P24 AG SERPL QL IA: NON REACTIVE
IMM GRANULOCYTES # BLD AUTO: 0 X10E3/UL (ref 0–0.1)
IMM GRANULOCYTES NFR BLD AUTO: 0 %
LDLC SERPL CALC-MCNC: 73 MG/DL (ref 0–99)
LYMPHOCYTES # BLD AUTO: 2.6 X10E3/UL (ref 0.7–3.1)
LYMPHOCYTES NFR BLD AUTO: 31 %
MCH RBC QN AUTO: 26.5 PG (ref 26.6–33)
MCHC RBC AUTO-ENTMCNC: 30 G/DL (ref 31.5–35.7)
MCV RBC AUTO: 88 FL (ref 79–97)
MONOCYTES # BLD AUTO: 0.4 X10E3/UL (ref 0.1–0.9)
MONOCYTES NFR BLD AUTO: 5 %
NEUTROPHILS # BLD AUTO: 4.9 X10E3/UL (ref 1.4–7)
NEUTROPHILS NFR BLD AUTO: 59 %
PLATELET # BLD AUTO: 303 X10E3/UL (ref 150–450)
POTASSIUM SERPL-SCNC: 4 MMOL/L (ref 3.5–5.2)
PROT SERPL-MCNC: 7.4 G/DL (ref 6–8.5)
RBC # BLD AUTO: 4.71 X10E6/UL (ref 3.77–5.28)
SODIUM SERPL-SCNC: 141 MMOL/L (ref 134–144)
TRIGL SERPL-MCNC: 145 MG/DL (ref 0–149)
VLDLC SERPL CALC-MCNC: 25 MG/DL (ref 5–40)
WBC # BLD AUTO: 8.3 X10E3/UL (ref 3.4–10.8)

## 2025-07-12 LAB
EST. AVERAGE GLUCOSE BLD GHB EST-MCNC: 123 MG/DL
HBA1C MFR BLD: 5.9 %HB

## 2025-08-05 RX ORDER — ACETAMINOPHEN AND CODEINE PHOSPHATE 120; 12 MG/5ML; MG/5ML
1 SOLUTION ORAL DAILY
Qty: 28 TABLET | Refills: 3 | Status: SHIPPED | OUTPATIENT
Start: 2025-08-05